# Patient Record
Sex: FEMALE | Race: WHITE | NOT HISPANIC OR LATINO
[De-identification: names, ages, dates, MRNs, and addresses within clinical notes are randomized per-mention and may not be internally consistent; named-entity substitution may affect disease eponyms.]

---

## 2019-09-25 PROBLEM — Z00.00 ENCOUNTER FOR PREVENTIVE HEALTH EXAMINATION: Status: ACTIVE | Noted: 2019-09-25

## 2019-09-26 ENCOUNTER — APPOINTMENT (OUTPATIENT)
Dept: VASCULAR SURGERY | Facility: CLINIC | Age: 64
End: 2019-09-26
Payer: COMMERCIAL

## 2019-09-26 DIAGNOSIS — Z86.73 PERSONAL HISTORY OF TRANSIENT ISCHEMIC ATTACK (TIA), AND CEREBRAL INFARCTION W/OUT RESIDUAL DEFICITS: ICD-10-CM

## 2019-09-26 DIAGNOSIS — Z86.79 PERSONAL HISTORY OF OTHER DISEASES OF THE CIRCULATORY SYSTEM: ICD-10-CM

## 2019-09-26 DIAGNOSIS — M62.9 DISORDER OF MUSCLE, UNSPECIFIED: ICD-10-CM

## 2019-09-26 DIAGNOSIS — Z78.9 OTHER SPECIFIED HEALTH STATUS: ICD-10-CM

## 2019-09-26 PROCEDURE — 99203 OFFICE O/P NEW LOW 30 MIN: CPT

## 2019-09-26 PROCEDURE — 93923 UPR/LXTR ART STDY 3+ LVLS: CPT

## 2019-09-26 NOTE — ASSESSMENT
[FreeTextEntry1] : 63 y/o F w/ intermittent calf claudication.\par Discussed with her the natural hx of PAD, as well as importance of risk factor control and exercise.\par She will perform a daily structured walking program, with the goal of increasing her walking distance.\par She will f/u with me in 3 months. [Arterial/Venous Disease] : arterial/venous disease

## 2019-09-26 NOTE — PHYSICAL EXAM
[Normal Breath Sounds] : Normal breath sounds [2+] : left 2+ [1+] : right 1+ [0] : left 0 [Alert] : alert [Oriented to Place] : oriented to place [Oriented to Person] : oriented to person [Calm] : calm [Ankle Swelling (On Exam)] : not present [Varicose Veins Of Lower Extremities] : not present [] : not present [de-identified] : NAD [de-identified] : NCAT [de-identified] : supple [de-identified] : soft, NT, ND; no palpable masses

## 2019-09-26 NOTE — HISTORY OF PRESENT ILLNESS
[FreeTextEntry1] : 65 y/o F here for evaluation of pain in legs with ambulation.\par She reports that her right calf becomes painful, tight and crampy when walking. Symptoms generally come on after walking for 5-10 minutes. Symptoms melisa on stopping for a couple minutes.\par These constellation of symptoms have been present for about 1.5 yrs.\par Long hx of smoking, quit 2 yrs ago.\par No hx of CAD, MI.

## 2019-09-26 NOTE — PROCEDURE
[FreeTextEntry1] : LIDIA / PVR performed - R LIDIA .68, L .63, some blunting of waveforms down calf and ankle

## 2019-09-26 NOTE — REVIEW OF SYSTEMS
[Leg Claudication] : intermittent leg claudication [Limb Pain] : limb pain [Negative] : Neurological

## 2019-12-19 ENCOUNTER — APPOINTMENT (OUTPATIENT)
Dept: VASCULAR SURGERY | Facility: CLINIC | Age: 64
End: 2019-12-19

## 2020-01-30 ENCOUNTER — APPOINTMENT (OUTPATIENT)
Dept: VASCULAR SURGERY | Facility: CLINIC | Age: 65
End: 2020-01-30
Payer: COMMERCIAL

## 2020-01-30 PROCEDURE — 99214 OFFICE O/P EST MOD 30 MIN: CPT

## 2020-02-21 NOTE — PHYSICAL EXAM
[Normal Breath Sounds] : Normal breath sounds [2+] : left 2+ [1+] : right 1+ [0] : left 0 [Varicose Veins Of Lower Extremities] : not present [] : not present [Ankle Swelling (On Exam)] : not present [Alert] : alert [Oriented to Person] : oriented to person [Calm] : calm [Oriented to Place] : oriented to place [de-identified] : NCAT [de-identified] : NAD [de-identified] : soft, NT, ND; no palpable masses [de-identified] : supple

## 2020-02-21 NOTE — HISTORY OF PRESENT ILLNESS
[FreeTextEntry1] : 63 y/o F here for evaluation of pain in legs with ambulation.\par She reports that her right calf becomes painful, tight and crampy when walking. Symptoms generally come on after walking for 5-10 minutes. Symptoms melisa on stopping for a couple minutes.\par These constellation of symptoms have been present for about 1.5 yrs.\par Long hx of smoking, quit 2 yrs ago.\par No hx of CAD, MI. [de-identified] : 1/30/20 - Doing well. Reports that she was mostly been walking daily. Walking distance improved.

## 2020-02-21 NOTE — ASSESSMENT
[FreeTextEntry1] : 65 y/o F w/ intermittent calf claudication.\par Discussed with her the natural hx of PAD, as well as importance of risk factor control and exercise.\par Her walking distance has improved with the adherence to daily walking.\par She reports that she can do better, and will continue to walk daily.\par Again discussed the importance of excellent foot care.\par She will perform a daily structured walking program, with the goal of increasing her walking distance.\par She will f/u with me in 3 months. [Arterial/Venous Disease] : arterial/venous disease

## 2020-04-30 ENCOUNTER — APPOINTMENT (OUTPATIENT)
Dept: VASCULAR SURGERY | Facility: CLINIC | Age: 65
End: 2020-04-30

## 2020-07-23 ENCOUNTER — APPOINTMENT (OUTPATIENT)
Dept: VASCULAR SURGERY | Facility: CLINIC | Age: 65
End: 2020-07-23
Payer: COMMERCIAL

## 2020-07-23 PROCEDURE — 93923 UPR/LXTR ART STDY 3+ LVLS: CPT

## 2020-07-23 PROCEDURE — 99214 OFFICE O/P EST MOD 30 MIN: CPT

## 2020-07-23 NOTE — HISTORY OF PRESENT ILLNESS
[FreeTextEntry1] : 63 y/o F here for evaluation of pain in legs with ambulation.\par She reports that her right calf becomes painful, tight and crampy when walking. Symptoms generally come on after walking for 5-10 minutes. Symptoms melisa on stopping for a couple minutes.\par These constellation of symptoms have been present for about 1.5 yrs.\par Long hx of smoking, quit 2 yrs ago.\par No hx of CAD, MI. [de-identified] : 1/30/20 - Doing well. Reports that she was mostly been walking daily. Walking distance improved.\par \par 7/23/20 - Reports that her walking has not been consistent due to pandemic. In addition, she has been under evaluation by podiatrist Dr. Landaverde for bunion and claw to deformity of right foot, considering intervention. Denies calf or foot pain.

## 2020-07-23 NOTE — PROCEDURE
[FreeTextEntry1] : LIDIA / PVR - R and L LIDIA 0.69, dampening of waveforms at metatarsal, maintains some pulsatility at digit

## 2020-07-23 NOTE — ASSESSMENT
[FreeTextEntry1] : 63 y/o F w/ intermittent calf claudication.\par Discussed with her the natural hx of PAD, as well as importance of risk factor control and exercise.\par Her walking distance has improved with the adherence to daily walking.\par She reports that she can do better, and will continue to walk daily.\par Again discussed the importance of excellent foot care.\par I will discuss her arterial disease with her podiatrist and we will decide if he will proceed with podiatric procedure\par She will f/u with me in 2 months [Arterial/Venous Disease] : arterial/venous disease

## 2020-07-23 NOTE — PHYSICAL EXAM
[Normal Breath Sounds] : Normal breath sounds [2+] : left 2+ [1+] : right 1+ [Ankle Swelling (On Exam)] : not present [0] : left 0 [] : not present [Alert] : alert [Varicose Veins Of Lower Extremities] : not present [Oriented to Place] : oriented to place [Oriented to Person] : oriented to person [Calm] : calm [de-identified] : NAD [de-identified] : NCAT [de-identified] : supple [de-identified] : soft, NT, ND; no palpable masses

## 2020-09-17 ENCOUNTER — APPOINTMENT (OUTPATIENT)
Dept: VASCULAR SURGERY | Facility: CLINIC | Age: 65
End: 2020-09-17

## 2020-12-14 ENCOUNTER — APPOINTMENT (OUTPATIENT)
Dept: HEMATOLOGY ONCOLOGY | Facility: CLINIC | Age: 65
End: 2020-12-14
Payer: COMMERCIAL

## 2020-12-14 VITALS
WEIGHT: 155 LBS | TEMPERATURE: 98.2 F | HEIGHT: 64 IN | DIASTOLIC BLOOD PRESSURE: 92 MMHG | BODY MASS INDEX: 26.46 KG/M2 | SYSTOLIC BLOOD PRESSURE: 158 MMHG | HEART RATE: 100 BPM | RESPIRATION RATE: 18 BRPM | OXYGEN SATURATION: 96 %

## 2020-12-14 DIAGNOSIS — Z80.0 FAMILY HISTORY OF MALIGNANT NEOPLASM OF DIGESTIVE ORGANS: ICD-10-CM

## 2020-12-14 DIAGNOSIS — Z87.891 PERSONAL HISTORY OF NICOTINE DEPENDENCE: ICD-10-CM

## 2020-12-14 PROCEDURE — 99072 ADDL SUPL MATRL&STAF TM PHE: CPT

## 2020-12-14 PROCEDURE — 99205 OFFICE O/P NEW HI 60 MIN: CPT

## 2020-12-15 NOTE — ASSESSMENT
[FreeTextEntry1] : This appears to be a relatively acute process as she reports hemoglobin levels of more than 13 g/dL in July from routine blood work performed at office visits.   Given how quickly and significantly her hemoglobin has dropped this past summer, to me this suggests more of an RBC loss/destructive process rather than an RBC production issue.  She has already been initiated on intravenous iron by her gastroenterologist and I will evaluate her hematopoietic potential once iron stores have been replete adequately.  She reports an essentially unremarkable bidirectional endoscopies and a recent capsule endoscopy.  I will request the records from the above.\par I will obtain a CBC with differential and a peripheral blood smear review.\par I will obtain a complete hematological workup to include a CMP, LDH, haptoglobin, reticulocyte count, B12/MMA/folic acid levels, TSH, SPEP, SIEP, IgQ,serum free light chains, NATO,  RF, panel, ferritin level, and a Jose' test.\par The patient will return in 2 weeks for followup, review of the above workup, and further recommendations.\par \par Thank you very much for allowing me to participate in the care of this patient. Should you have any questions or concerns please do not hesitate to call me directly.

## 2020-12-15 NOTE — HISTORY OF PRESENT ILLNESS
[de-identified] : Mrs. Manley is a 65 year old female who is referred by Dr.Michael Truong for second opinion for anemia. \par She reports having normal blood counts as of July but was found in Sept to have symptomatic anemia. She has received several transfusions most recently this weekend.  She is also receiving IV iron infusions from her gastroenterologist.  Endoscopy, colonoscopy and capsule endoscopy were all negative and her GI believes that her NEGRA is unrelated to GI issues.  [0 - No Distress] : Distress Level: 0

## 2020-12-18 LAB
25(OH)D3 SERPL-MCNC: 46.5 NG/ML
ALBUMIN MFR SERPL ELPH: 53.7 %
ALBUMIN SERPL-MCNC: 3.9 G/DL
ALBUMIN/GLOB SERPL: 1.2 RATIO
ALPHA1 GLOB MFR SERPL ELPH: 5.4 %
ALPHA1 GLOB SERPL ELPH-MCNC: 0.4 G/DL
ALPHA2 GLOB MFR SERPL ELPH: 14 %
ALPHA2 GLOB SERPL ELPH-MCNC: 1 G/DL
ANA SER IF-ACNC: NEGATIVE
B-GLOBULIN MFR SERPL ELPH: 14.5 %
B-GLOBULIN SERPL ELPH-MCNC: 1 G/DL
DEPRECATED KAPPA LC FREE/LAMBDA SER: 1.94 RATIO
DEPRECATED KAPPA LC FREE/LAMBDA SER: 1.94 RATIO
DIRECT COOMBS: NORMAL
EPO SERPL-MCNC: 19 MIU/ML
FERRITIN SERPL-MCNC: 608 NG/ML
FOLATE SERPL-MCNC: >20 NG/ML
GAMMA GLOB FLD ELPH-MCNC: 0.9 G/DL
GAMMA GLOB MFR SERPL ELPH: 12.4 %
HAPTOGLOB SERPL-MCNC: 228 MG/DL
IGA SER QL IEP: 327 MG/DL
IGG SER QL IEP: 872 MG/DL
IGM SER QL IEP: 58 MG/DL
INTERPRETATION SERPL IEP-IMP: NORMAL
IRON SATN MFR SERPL: 18 %
IRON SERPL-MCNC: 70 UG/DL
KAPPA LC CSF-MCNC: 4.25 MG/DL
KAPPA LC CSF-MCNC: 4.25 MG/DL
KAPPA LC SERPL-MCNC: 8.23 MG/DL
KAPPA LC SERPL-MCNC: 8.23 MG/DL
LDH SERPL-CCNC: 233 U/L
M PROTEIN SPEC IFE-MCNC: NORMAL
PROT SERPL-MCNC: 7.2 G/DL
PROT SERPL-MCNC: 7.2 G/DL
RHEUMATOID FACT SER QL: <10 IU/ML
TIBC SERPL-MCNC: 390 UG/DL
UIBC SERPL-MCNC: 320 UG/DL
VIT B12 SERPL-MCNC: 1041 PG/ML

## 2020-12-27 LAB — METHYLMALONATE SERPL-SCNC: 404 NMOL/L

## 2020-12-28 ENCOUNTER — APPOINTMENT (OUTPATIENT)
Dept: HEMATOLOGY ONCOLOGY | Facility: CLINIC | Age: 65
End: 2020-12-28
Payer: COMMERCIAL

## 2020-12-28 VITALS
HEIGHT: 64 IN | BODY MASS INDEX: 26.46 KG/M2 | TEMPERATURE: 97.9 F | HEART RATE: 100 BPM | DIASTOLIC BLOOD PRESSURE: 91 MMHG | WEIGHT: 155 LBS | RESPIRATION RATE: 18 BRPM | SYSTOLIC BLOOD PRESSURE: 175 MMHG | OXYGEN SATURATION: 96 %

## 2020-12-28 DIAGNOSIS — R79.89 OTHER SPECIFIED ABNORMAL FINDINGS OF BLOOD CHEMISTRY: ICD-10-CM

## 2020-12-28 PROCEDURE — 99214 OFFICE O/P EST MOD 30 MIN: CPT

## 2020-12-28 PROCEDURE — 99072 ADDL SUPL MATRL&STAF TM PHE: CPT

## 2020-12-28 NOTE — ASSESSMENT
[FreeTextEntry1] : This appears to be a relatively acute process as she reports hemoglobin levels of more than 13 g/dL in July from routine blood work performed at office visits.   Given how quickly and significantly her hemoglobin has dropped this past summer, to me this suggests more of an RBC loss/destructive process rather than an RBC production issue.  She has already been initiated on intravenous iron by her gastroenterologist and I will evaluate her hematopoietic potential once iron stores have been replete adequately.  She reports an essentially unremarkable bidirectional endoscopies and a recent capsule endoscopy.  I have requested the records from the above.\par I obtained a CBC with differential and a peripheral blood smear review.\par I obtained a complete hematological workup that included a CMP, LDH, haptoglobin, reticulocyte count, B12/MMA/folic acid levels, TSH, SPEP, SIEP, IgQ,serum free light chains, NATO,  RF, panel, ferritin level, and a Jose' test.  Her hemoglobin has now improved to 11.3 g/dL, iron indices have mostly normalized, and her serum methylmalonic acid level was elevated.  Given this I will initiate her on parenteral B12 and will monitor her iron indices.   Another contributing factor may be her chronic renal insufficiency.    At this time she does not warrant or qualify for LEVI therapy.  She will return in 1 month for follow-up with repeat laboratories.

## 2020-12-28 NOTE — HISTORY OF PRESENT ILLNESS
[0 - No Distress] : Distress Level: 0 [de-identified] : Mrs. Manley is a 65 year old female who is referred by Dr.Michael Truong for second opinion for anemia. \par She reports having normal blood counts as of July but was found in Sept to have symptomatic anemia. She has received several transfusions most recently this weekend.  She is also receiving IV iron infusions from her gastroenterologist.  Endoscopy, colonoscopy and capsule endoscopy were all negative and her GI believes that her NEGRA is unrelated to GI issues.  [de-identified] : Reports that she is generally feeling better at this time.  She has now completed her IV iron therapy with her gastroenterologist.

## 2020-12-29 ENCOUNTER — TRANSCRIPTION ENCOUNTER (OUTPATIENT)
Age: 65
End: 2020-12-29

## 2021-01-05 ENCOUNTER — NON-APPOINTMENT (OUTPATIENT)
Age: 66
End: 2021-01-05

## 2021-01-25 ENCOUNTER — APPOINTMENT (OUTPATIENT)
Dept: HEMATOLOGY ONCOLOGY | Facility: CLINIC | Age: 66
End: 2021-01-25
Payer: COMMERCIAL

## 2021-01-25 VITALS
SYSTOLIC BLOOD PRESSURE: 180 MMHG | RESPIRATION RATE: 18 BRPM | DIASTOLIC BLOOD PRESSURE: 84 MMHG | BODY MASS INDEX: 26.29 KG/M2 | TEMPERATURE: 98 F | HEART RATE: 90 BPM | HEIGHT: 64 IN | OXYGEN SATURATION: 96 % | WEIGHT: 154 LBS

## 2021-01-25 PROCEDURE — 99214 OFFICE O/P EST MOD 30 MIN: CPT

## 2021-01-25 PROCEDURE — 99072 ADDL SUPL MATRL&STAF TM PHE: CPT

## 2021-01-25 NOTE — ASSESSMENT
[FreeTextEntry1] : This appears to be a relatively acute process as she reports hemoglobin levels of more than 13 g/dL in July from routine blood work performed at office visits.   Given how quickly and significantly her hemoglobin has dropped this past summer, to me this suggests more of an RBC loss/destructive process rather than an RBC production issue.  She has already been initiated on intravenous iron by her gastroenterologist and I will evaluate her hematopoietic potential once iron stores have been replete adequately.  She reports an essentially unremarkable bidirectional endoscopies and a recent capsule endoscopy.  I have requested the records from the above.\par I obtained a CBC with differential and a peripheral blood smear review.\par I obtained a complete hematological workup that included a CMP, LDH, haptoglobin, reticulocyte count, B12/MMA/folic acid levels, TSH, SPEP, SIEP, IgQ,serum free light chains, NATO,  RF, panel, ferritin level, and a Jose' test.  Her hemoglobin has now improved to 11.3 g/dL, iron indices have mostly normalized, and her serum methylmalonic acid level was elevated.  Given this I initiated her on parenteral B12 and will monitor her iron indices.  Another contributing factor may be her chronic renal insufficiency.    At this time she does not warrant or qualify for LEVI therapy.   Her hemoglobin has decreased somewhat to 10.3 g/dL today.  She will continue parenteral B12 and return in 2 weeks with repeat counts to assure that they remain stable or improved.  Her iron indices will be rechecked.

## 2021-01-25 NOTE — HISTORY OF PRESENT ILLNESS
[0 - No Distress] : Distress Level: 0 [de-identified] : Mrs. Manley is a 65 year old female who is referred by Dr.Michael Truong for second opinion for anemia. \par She reports having normal blood counts as of July but was found in Sept to have symptomatic anemia. She has received several transfusions most recently this weekend.  She is also receiving IV iron infusions from her gastroenterologist.  Endoscopy, colonoscopy and capsule endoscopy were all negative and her GI believes that her NEGRA is unrelated to GI issues.  [de-identified] : Reports that she is generally feeling better at this time.  She has now completed her IV iron therapy with her gastroenterologist.

## 2021-02-08 ENCOUNTER — APPOINTMENT (OUTPATIENT)
Dept: HEMATOLOGY ONCOLOGY | Facility: CLINIC | Age: 66
End: 2021-02-08
Payer: COMMERCIAL

## 2021-02-08 VITALS
DIASTOLIC BLOOD PRESSURE: 84 MMHG | WEIGHT: 153 LBS | SYSTOLIC BLOOD PRESSURE: 165 MMHG | RESPIRATION RATE: 18 BRPM | OXYGEN SATURATION: 97 % | TEMPERATURE: 98.5 F | BODY MASS INDEX: 26.12 KG/M2 | HEART RATE: 92 BPM | HEIGHT: 64 IN

## 2021-02-08 PROCEDURE — 99072 ADDL SUPL MATRL&STAF TM PHE: CPT

## 2021-02-08 PROCEDURE — 99214 OFFICE O/P EST MOD 30 MIN: CPT

## 2021-02-08 NOTE — HISTORY OF PRESENT ILLNESS
[0 - No Distress] : Distress Level: 0 [de-identified] : Mrs. Manley is a 65 year old female who is referred by Dr.Michael Truong for second opinion for anemia. \par She reports having normal blood counts as of July but was found in Sept to have symptomatic anemia. She has received several transfusions most recently this weekend.  She is also receiving IV iron infusions from her gastroenterologist.  Endoscopy, colonoscopy and capsule endoscopy were all negative and her GI believes that her NEGRA is unrelated to GI issues.  [de-identified] : Reports that she is generally feeling better at this time.  She has now completed her IV iron therapy with her gastroenterologist.

## 2021-02-08 NOTE — ASSESSMENT
[FreeTextEntry1] : This appears to be a relatively acute process as she reports hemoglobin levels of more than 13 g/dL in July from routine blood work performed at office visits.   Given how quickly and significantly her hemoglobin has dropped this past summer, to me this suggests more of an RBC loss/destructive process rather than an RBC production issue.  She has already been initiated on intravenous iron by her gastroenterologist and I will evaluate her hematopoietic potential once iron stores have been replete adequately.  She reports an essentially unremarkable bidirectional endoscopies and a recent capsule endoscopy.  I have requested the records from the above.\par I obtained a CBC with differential and a peripheral blood smear review.\par I obtained a complete hematological workup that included a CMP, LDH, haptoglobin, reticulocyte count, B12/MMA/folic acid levels, TSH, SPEP, SIEP, IgQ,serum free light chains, NATO,  RF, panel, ferritin level, and a Jose' test.  Her hemoglobin had improved to 11.3 g/dL, iron indices have mostly normalized, and her serum methylmalonic acid level was elevated.  Given this I initiated her on parenteral B12 and will monitor her iron indices.  Another contributing factor may be her chronic renal insufficiency.    At this time she does not warrant or qualify for LEVI therapy.   Her hemoglobin has decreased somewhat to 10.3 g/dL at the last visit.  She will continue parenteral B12 and return in 2 weeks with repeat counts to assure that they remain stable or improved and B12.  Her iron indices will be rechecked.

## 2021-02-22 LAB
FERRITIN SERPL-MCNC: 395 NG/ML
FERRITIN SERPL-MCNC: 62 NG/ML
FERRITIN SERPL-MCNC: 90 NG/ML
IRON SATN MFR SERPL: 43 %
IRON SATN MFR SERPL: NORMAL %
IRON SERPL-MCNC: 146 UG/DL
IRON SERPL-MCNC: 355 UG/DL
TIBC SERPL-MCNC: 340 UG/DL
TIBC SERPL-MCNC: NORMAL UG/DL
UIBC SERPL-MCNC: 193 UG/DL
UIBC SERPL-MCNC: <20 UG/DL

## 2021-02-24 ENCOUNTER — APPOINTMENT (OUTPATIENT)
Dept: HEMATOLOGY ONCOLOGY | Facility: CLINIC | Age: 66
End: 2021-02-24
Payer: COMMERCIAL

## 2021-02-24 VITALS
BODY MASS INDEX: 25.92 KG/M2 | HEART RATE: 90 BPM | RESPIRATION RATE: 18 BRPM | SYSTOLIC BLOOD PRESSURE: 173 MMHG | OXYGEN SATURATION: 96 % | DIASTOLIC BLOOD PRESSURE: 81 MMHG | TEMPERATURE: 98 F | WEIGHT: 151 LBS

## 2021-02-24 PROCEDURE — 99072 ADDL SUPL MATRL&STAF TM PHE: CPT

## 2021-02-24 PROCEDURE — 99214 OFFICE O/P EST MOD 30 MIN: CPT

## 2021-02-24 RX ORDER — SIMVASTATIN 5 MG/1
5 TABLET, FILM COATED ORAL
Refills: 0 | Status: ACTIVE | COMMUNITY

## 2021-02-24 RX ORDER — POLYSACCHARIDE-IRON COMPLEX 150 MG/1
CAPSULE ORAL
Refills: 0 | Status: ACTIVE | COMMUNITY

## 2021-02-24 RX ORDER — UBIDECARENONE/VIT E ACET 100MG-5
1000 CAPSULE ORAL
Refills: 0 | Status: ACTIVE | COMMUNITY

## 2021-02-24 RX ORDER — CLOPIDOGREL BISULFATE 75 MG/1
75 TABLET, FILM COATED ORAL
Refills: 0 | Status: ACTIVE | COMMUNITY

## 2021-02-24 RX ORDER — PANTOPRAZOLE SODIUM 40 MG/1
40 TABLET, DELAYED RELEASE ORAL
Refills: 0 | Status: ACTIVE | COMMUNITY

## 2021-02-24 NOTE — ASSESSMENT
[FreeTextEntry1] : This appears to be a relatively acute process as she reports hemoglobin levels of more than 13 g/dL in July from routine blood work performed at office visits.   Given how quickly and significantly her hemoglobin has dropped this past summer, to me this suggests more of an RBC loss/destructive process rather than an RBC production issue.  She has already been initiated on intravenous iron by her gastroenterologist and I will evaluate her hematopoietic potential once iron stores have been replete adequately.  She reports an essentially unremarkable bidirectional endoscopies and a recent capsule endoscopy.  I have requested the records from the above.\par I obtained a CBC with differential and a peripheral blood smear review.\par I obtained a complete hematological workup that included a CMP, LDH, haptoglobin, reticulocyte count, B12/MMA/folic acid levels, TSH, SPEP, SIEP, IgQ,serum free light chains, NATO,  RF, panel, ferritin level, and a Jose' test.  Her hemoglobin had improved to 11.3 g/dL, iron indices have mostly normalized, and her serum methylmalonic acid level was elevated.  Given this I initiated her on parenteral B12 and will monitor her iron indices.  Another contributing factor may be her chronic renal insufficiency.    At this time she does not warrant or qualify for LEVI therapy.   Her hemoglobin has improved to 12.3.  Continue monthly B12.  We will forward her labs to her nephrologist for renal insufficiency.  She is not a candidate for LEVI's this time\par Continue routine, age-appropriate, healthcare maintenance \par History of present illness, review of systems, physical exam and treatment plan reviewed with .\par Office visit in 4 weeks or prn for new or worsening symptoms.

## 2021-02-24 NOTE — HISTORY OF PRESENT ILLNESS
[0 - No Distress] : Distress Level: 0 [de-identified] : Mrs. Manley is a 65 year old female who is referred by Dr.Michael Truong for second opinion for anemia. \par She reports having normal blood counts as of July but was found in Sept to have symptomatic anemia. She has received several transfusions most recently this weekend.  She is also receiving IV iron infusions from her gastroenterologist.  Endoscopy, colonoscopy and capsule endoscopy were all negative and her GI believes that her NEGRA is unrelated to GI issues.  [FreeTextEntry1] : Parenteral B12 monthly [de-identified] : She presents for follow-up of B12 and iron deficiency anemias, sending for monthly dose of B12 parenterally.  She reports feeling well.  Her hemoglobin has increased from 10.2-12.3 with B12 therapy.  She begins monthly dosing today as she has finished her loading dose .  Completed IV iron therapy with her gastroenterologist several months ago and continues on oral iron therapy with some constipation.

## 2021-02-24 NOTE — RESULTS/DATA
[FreeTextEntry1] : WBC 6.95\par Hemoglobin 12.3\par Hematocrit 40.3\par Platelet count 313,000\par Creatinine 1.52\par EGFR 36

## 2021-03-24 ENCOUNTER — APPOINTMENT (OUTPATIENT)
Dept: HEMATOLOGY ONCOLOGY | Facility: CLINIC | Age: 66
End: 2021-03-24
Payer: COMMERCIAL

## 2021-03-24 VITALS
TEMPERATURE: 98.1 F | SYSTOLIC BLOOD PRESSURE: 158 MMHG | RESPIRATION RATE: 18 BRPM | DIASTOLIC BLOOD PRESSURE: 90 MMHG | OXYGEN SATURATION: 96 % | HEIGHT: 64 IN | HEART RATE: 90 BPM | WEIGHT: 153 LBS | BODY MASS INDEX: 26.12 KG/M2

## 2021-03-24 PROCEDURE — 99072 ADDL SUPL MATRL&STAF TM PHE: CPT

## 2021-03-24 PROCEDURE — 99214 OFFICE O/P EST MOD 30 MIN: CPT

## 2021-03-24 NOTE — HISTORY OF PRESENT ILLNESS
[de-identified] : Mrs. Manley is a 65 year old female who is referred by Dr.Michael Truong for second opinion for anemia. \par She reports having normal blood counts as of July but was found in Sept to have symptomatic anemia. She has received several transfusions most recently this weekend.  She is also receiving IV iron infusions from her gastroenterologist.  Endoscopy, colonoscopy and capsule endoscopy were all negative and her GI believes that her NEGRA is unrelated to GI issues.  [FreeTextEntry1] : Parenteral B12 monthly [de-identified] : She presents for follow-up of B12 and iron deficiency anemias, sending for monthly dose of B12 parenterally.  She reports feeling well.  Her hemoglobin has increased with B12 therapy.  Completed IV iron therapy with her gastroenterologist several months ago and continues on oral iron therapy with some constipation.

## 2021-04-12 ENCOUNTER — NON-APPOINTMENT (OUTPATIENT)
Age: 66
End: 2021-04-12

## 2021-04-21 ENCOUNTER — APPOINTMENT (OUTPATIENT)
Dept: HEMATOLOGY ONCOLOGY | Facility: CLINIC | Age: 66
End: 2021-04-21
Payer: COMMERCIAL

## 2021-04-21 VITALS
HEART RATE: 91 BPM | SYSTOLIC BLOOD PRESSURE: 154 MMHG | RESPIRATION RATE: 18 BRPM | WEIGHT: 152 LBS | BODY MASS INDEX: 25.95 KG/M2 | OXYGEN SATURATION: 96 % | TEMPERATURE: 98 F | HEIGHT: 64 IN | DIASTOLIC BLOOD PRESSURE: 85 MMHG

## 2021-04-21 LAB
FERRITIN SERPL-MCNC: 57 NG/ML
IRON SATN MFR SERPL: 15 %
IRON SERPL-MCNC: 53 UG/DL
TIBC SERPL-MCNC: 350 UG/DL
UIBC SERPL-MCNC: 297 UG/DL

## 2021-04-21 PROCEDURE — 99214 OFFICE O/P EST MOD 30 MIN: CPT

## 2021-04-21 PROCEDURE — 99072 ADDL SUPL MATRL&STAF TM PHE: CPT

## 2021-04-22 LAB
FERRITIN SERPL-MCNC: 44 NG/ML
IRON SATN MFR SERPL: 13 %
IRON SERPL-MCNC: 48 UG/DL
TIBC SERPL-MCNC: 364 UG/DL
UIBC SERPL-MCNC: 316 UG/DL

## 2021-04-22 NOTE — ASSESSMENT
[FreeTextEntry1] : This appears to be a relatively acute process as she reports hemoglobin levels of more than 13 g/dL in July from routine blood work performed at office visits.   Given how quickly and significantly her hemoglobin has dropped this past summer, to me this suggests more of an RBC loss/destructive process rather than an RBC production issue.  She has already been initiated on intravenous iron by her gastroenterologist and I will evaluate her hematopoietic potential once iron stores have been replete adequately.  She reports an essentially unremarkable bidirectional endoscopies and a recent capsule endoscopy.  I have requested the records from the above.\par I obtained a CBC with differential and a peripheral blood smear review.\par I obtained a complete hematological workup that included a CMP, LDH, haptoglobin, reticulocyte count, B12/MMA/folic acid levels, TSH, SPEP, SIEP, IgQ,serum free light chains, NATO,  RF, panel, ferritin level, and a Jose' test.  Her hemoglobin had improved to 11.3 g/dL, iron indices have mostly normalized, and her serum methylmalonic acid level was elevated.  Given this I initiated her on parenteral B12 and will monitor her iron indices.  Another contributing factor may be her chronic renal insufficiency.    At this time she does not warrant or qualify for LEVI therapy.   Her hemoglobin has improved to 13.8.  Continue monthly B12.  We will forward her labs to her nephrologist for renal insufficiency.  She is not a candidate for LEVI's this time\par Continue routine, age-appropriate, healthcare maintenance \par Office visit in 4 weeks or prn for new or worsening symptoms.

## 2021-04-22 NOTE — HISTORY OF PRESENT ILLNESS
[de-identified] : Mrs. Manley is a 66 year old female who is referred by Dr.Michael Truong for second opinion for anemia. \par She reports having normal blood counts as of July but was found in Sept to have symptomatic anemia. She has received several transfusions.  She had also received IV iron infusions from her gastroenterologist.  Endoscopy, colonoscopy and capsule endoscopy were all negative and her GI believes that her NEGRA is unrelated to GI issues.  [FreeTextEntry1] : Parenteral B12 monthly [de-identified] : She presents for follow-up of B12 and iron deficiency anemias, sending for monthly dose of B12 parenterally.  She reports feeling well.  Her hemoglobin has increased with B12 therapy.  Completed IV iron therapy with her gastroenterologist several months ago and continues on oral iron therapy with some constipation.

## 2021-04-26 ENCOUNTER — NON-APPOINTMENT (OUTPATIENT)
Age: 66
End: 2021-04-26

## 2021-05-20 ENCOUNTER — APPOINTMENT (OUTPATIENT)
Dept: HEMATOLOGY ONCOLOGY | Facility: CLINIC | Age: 66
End: 2021-05-20
Payer: COMMERCIAL

## 2021-05-20 VITALS
RESPIRATION RATE: 18 BRPM | DIASTOLIC BLOOD PRESSURE: 78 MMHG | BODY MASS INDEX: 24.92 KG/M2 | OXYGEN SATURATION: 96 % | SYSTOLIC BLOOD PRESSURE: 160 MMHG | HEART RATE: 89 BPM | HEIGHT: 64 IN | WEIGHT: 146 LBS | TEMPERATURE: 97.4 F

## 2021-05-20 PROCEDURE — 99072 ADDL SUPL MATRL&STAF TM PHE: CPT

## 2021-05-20 PROCEDURE — 99214 OFFICE O/P EST MOD 30 MIN: CPT

## 2021-05-28 LAB
FERRITIN SERPL-MCNC: 567 NG/ML
IRON SATN MFR SERPL: 24 %
IRON SERPL-MCNC: 69 UG/DL
TIBC SERPL-MCNC: 287 UG/DL
UIBC SERPL-MCNC: 218 UG/DL

## 2021-05-28 NOTE — HISTORY OF PRESENT ILLNESS
[0 - No Distress] : Distress Level: 0 [de-identified] : Mrs. Manley is a 66 year old female who is referred by Dr.Michael Truong for second opinion for anemia. \par She reports having normal blood counts as of July but was found in Sept to have symptomatic anemia. She has received several transfusions.  She had also received IV iron infusions from her gastroenterologist.  Endoscopy, colonoscopy and capsule endoscopy were all negative and her GI believes that her NEGRA is unrelated to GI issues.  [FreeTextEntry1] : Parenteral B12 monthly [de-identified] : She presents for follow-up of B12 and iron deficiency anemias, receiving monthly dose of B12 parenterally.  She completed Venofer recently.  Feels well.  She denies rash, fever, infections, bleeding, bruising, nausea,vomiting,cough, SOB, chest pain, change in BM, headache or dizziness.

## 2021-05-28 NOTE — ASSESSMENT
[FreeTextEntry1] : This appears to be a relatively acute process as she reports hemoglobin levels of more than 13 g/dL in July from routine blood work performed at office visits.   Given how quickly and significantly her hemoglobin has dropped this past summer, to me this suggests more of an RBC loss/destructive process rather than an RBC production issue.  She has already been initiated on intravenous iron by her gastroenterologist and I will evaluate her hematopoietic potential once iron stores have been replete adequately.  She reports an essentially unremarkable bidirectional endoscopies and a recent capsule endoscopy.  I have requested the records from the above.\par I obtained a CBC with differential and a peripheral blood smear review.\par I obtained a complete hematological workup that included a CMP, LDH, haptoglobin, reticulocyte count, B12/MMA/folic acid levels, TSH, SPEP, SIEP, IgQ,serum free light chains, NATO,  RF, panel, ferritin level, and a Jose' test.  Her hemoglobin had improved to 11.3 g/dL, iron indices have mostly normalized, and her serum methylmalonic acid level was elevated.  Given this I initiated her on parenteral B12 and will monitor her iron indices.  Another contributing factor may be her chronic renal insufficiency.    At this time she does not warrant or qualify for LEVI therapy.   Her hemoglobin has improved to 15.2.  Continue monthly B12.  We will forward her labs to her nephrologist for renal insufficiency.  She is not a candidate for LEVI's this time\par She completed Venofer recently\par Continue routine, age-appropriate, healthcare maintenance \par History of present illness, review of systems, physical exam and treatment plan reviewed with .\par Office visit in 4 weeks or prn for new or worsening symptoms.

## 2021-07-06 ENCOUNTER — APPOINTMENT (OUTPATIENT)
Dept: HEMATOLOGY ONCOLOGY | Facility: CLINIC | Age: 66
End: 2021-07-06
Payer: COMMERCIAL

## 2021-07-06 VITALS
OXYGEN SATURATION: 97 % | HEART RATE: 82 BPM | SYSTOLIC BLOOD PRESSURE: 165 MMHG | DIASTOLIC BLOOD PRESSURE: 77 MMHG | WEIGHT: 145 LBS | BODY MASS INDEX: 28.47 KG/M2 | TEMPERATURE: 98.1 F | RESPIRATION RATE: 18 BRPM | HEIGHT: 60 IN

## 2021-07-06 PROCEDURE — 99213 OFFICE O/P EST LOW 20 MIN: CPT

## 2021-07-06 PROCEDURE — 99072 ADDL SUPL MATRL&STAF TM PHE: CPT

## 2021-07-06 RX ORDER — VERAPAMIL HYDROCHLORIDE 360 MG/1
360 CAPSULE, DELAYED RELEASE PELLETS ORAL
Qty: 30 | Refills: 0 | Status: ACTIVE | COMMUNITY
Start: 2021-06-21

## 2021-07-11 LAB
FERRITIN SERPL-MCNC: 321 NG/ML
IRON SATN MFR SERPL: 28 %
IRON SERPL-MCNC: 96 UG/DL
TIBC SERPL-MCNC: 348 UG/DL
UIBC SERPL-MCNC: 252 UG/DL

## 2021-07-11 NOTE — HISTORY OF PRESENT ILLNESS
[0 - No Distress] : Distress Level: 0 [de-identified] : Mrs. Manley is a 66 year old female who is referred by Dr.Michael Truong for second opinion for anemia. \par She reports having normal blood counts as of July but was found in Sept to have symptomatic anemia. She has received several transfusions.  She had also received IV iron infusions from her gastroenterologist.  Endoscopy, colonoscopy and capsule endoscopy were all negative and her GI believes that her NEGRA is unrelated to GI issues.  [FreeTextEntry1] : Parenteral B12 monthly [de-identified] : She presents for follow-up of B12 and iron deficiency anemias, receiving monthly dose of B12 parenterally.  She completed Venofer recently.  She is feeling better with more energy..  She denies rash, fever, infections, bleeding, bruising, nausea,vomiting,cough, SOB, chest pain, change in BM, headache or dizziness.

## 2021-07-11 NOTE — RESULTS/DATA
[FreeTextEntry1] : WBC 7.12\par Hemoglobin 14.6\par Hematocrit 45.6\par Platelet count 249,000 \par \par Creatinine 1.20\par EGFR 47

## 2021-07-11 NOTE — ASSESSMENT
[FreeTextEntry1] : This appears to be a relatively acute process as she reports hemoglobin levels of more than 13 g/dL in July from routine blood work performed at office visits.   Given how quickly and significantly her hemoglobin has dropped this past summer, to me this suggests more of an RBC loss/destructive process rather than an RBC production issue.  She has already been initiated on intravenous iron by her gastroenterologist and I will evaluate her hematopoietic potential once iron stores have been replete adequately.  She reports an essentially unremarkable bidirectional endoscopies and a recent capsule endoscopy.  I have requested the records from the above.\par I obtained a CBC with differential and a peripheral blood smear review.\par I obtained a complete hematological workup that included a CMP, LDH, haptoglobin, reticulocyte count, B12/MMA/folic acid levels, TSH, SPEP, SIEP, IgQ,serum free light chains, NATO,  RF, panel, ferritin level, and a Jose' test.  Her hemoglobin had improved to 11.3 g/dL, iron indices have mostly normalized, and her serum methylmalonic acid level was elevated.  Given this I initiated her on parenteral B12 and will monitor her iron indices.  Another contributing factor may be her chronic renal insufficiency.    At this time she does not warrant or qualify for LEVI therapy.   Her hemoglobin has improved to 14.6.  Continue monthly B12.  We will forward her labs to her nephrologist for renal insufficiency.  \par She completed Venofer recently\par Continue monthly parenteral B12\par Continue routine, age-appropriate, healthcare maintenance \par History of present illness, review of systems, physical exam and treatment plan reviewed with .\par Office visit in 4 weeks or prn for new or worsening symptoms.

## 2021-08-05 ENCOUNTER — APPOINTMENT (OUTPATIENT)
Dept: HEMATOLOGY ONCOLOGY | Facility: CLINIC | Age: 66
End: 2021-08-05
Payer: COMMERCIAL

## 2021-08-05 ENCOUNTER — LABORATORY RESULT (OUTPATIENT)
Age: 66
End: 2021-08-05

## 2021-08-05 VITALS
TEMPERATURE: 98.7 F | OXYGEN SATURATION: 96 % | SYSTOLIC BLOOD PRESSURE: 169 MMHG | HEIGHT: 62.5 IN | BODY MASS INDEX: 25.66 KG/M2 | RESPIRATION RATE: 18 BRPM | DIASTOLIC BLOOD PRESSURE: 72 MMHG | HEART RATE: 90 BPM | WEIGHT: 143 LBS

## 2021-08-05 PROCEDURE — 99214 OFFICE O/P EST MOD 30 MIN: CPT

## 2021-09-02 ENCOUNTER — APPOINTMENT (OUTPATIENT)
Dept: HEMATOLOGY ONCOLOGY | Facility: CLINIC | Age: 66
End: 2021-09-02
Payer: COMMERCIAL

## 2021-09-02 VITALS
DIASTOLIC BLOOD PRESSURE: 80 MMHG | BODY MASS INDEX: 25.48 KG/M2 | TEMPERATURE: 97.7 F | SYSTOLIC BLOOD PRESSURE: 170 MMHG | RESPIRATION RATE: 18 BRPM | OXYGEN SATURATION: 96 % | WEIGHT: 142 LBS | HEIGHT: 62.5 IN | HEART RATE: 89 BPM

## 2021-09-02 PROCEDURE — 99214 OFFICE O/P EST MOD 30 MIN: CPT

## 2021-10-07 ENCOUNTER — APPOINTMENT (OUTPATIENT)
Dept: HEMATOLOGY ONCOLOGY | Facility: CLINIC | Age: 66
End: 2021-10-07
Payer: COMMERCIAL

## 2021-10-07 VITALS
WEIGHT: 140 LBS | HEART RATE: 90 BPM | OXYGEN SATURATION: 95 % | RESPIRATION RATE: 18 BRPM | TEMPERATURE: 98.4 F | HEIGHT: 62.5 IN | DIASTOLIC BLOOD PRESSURE: 90 MMHG | SYSTOLIC BLOOD PRESSURE: 166 MMHG | BODY MASS INDEX: 25.12 KG/M2

## 2021-10-07 LAB
FERRITIN SERPL-MCNC: 255 NG/ML
IRON SATN MFR SERPL: 29 %
IRON SERPL-MCNC: 96 UG/DL
TIBC SERPL-MCNC: 328 UG/DL
UIBC SERPL-MCNC: 232 UG/DL

## 2021-10-07 PROCEDURE — 99213 OFFICE O/P EST LOW 20 MIN: CPT

## 2021-10-16 LAB
FERRITIN SERPL-MCNC: 215 NG/ML
IRON SATN MFR SERPL: 24 %
IRON SERPL-MCNC: 76 UG/DL
TIBC SERPL-MCNC: 315 UG/DL
UIBC SERPL-MCNC: 239 UG/DL

## 2021-10-16 NOTE — HISTORY OF PRESENT ILLNESS
[0 - No Distress] : Distress Level: 0 [de-identified] : Mrs. Manley is a 66 year old female who is referred by Dr.Michael Truong for second opinion for anemia. \par She reports having normal blood counts as of July but was found in Sept to have symptomatic anemia. She has received several transfusions.  She had also received IV iron infusions from her gastroenterologist.  Endoscopy, colonoscopy and capsule endoscopy were all negative and her GI believes that her NEGRA is unrelated to GI issues.  [FreeTextEntry1] : Parenteral B12 monthly [de-identified] : She presents for follow-up of B12 and iron deficiency anemias, receiving monthly dose of B12 parenterally.  She completed Venofer in May 2021.  She feels well.  She is feeling better with more energy.  She denies rash, fever, infections, bleeding, bruising, nausea,vomiting,cough, SOB, chest pain, change in BM, headache or dizziness.

## 2021-10-16 NOTE — ASSESSMENT
[FreeTextEntry1] : This appears to be a relatively acute process as she reports hemoglobin levels of more than 13 g/dL in July from routine blood work performed at office visits.   Given how quickly and significantly her hemoglobin has dropped this past summer, to me this suggests more of an RBC loss/destructive process rather than an RBC production issue.  She has already been initiated on intravenous iron by her gastroenterologist and I will evaluate her hematopoietic potential once iron stores have been replete adequately.  She reports an essentially unremarkable bidirectional endoscopies and a recent capsule endoscopy.  I have requested the records from the above.\par I obtained a CBC with differential and a peripheral blood smear review.\par I obtained a complete hematological workup that included a CMP, LDH, haptoglobin, reticulocyte count, B12/MMA/folic acid levels, TSH, SPEP, SIEP, IgQ,serum free light chains, NATO,  RF, panel, ferritin level, and a Jose' test.  Her hemoglobin had improved to 11.3 g/dL, iron indices have mostly normalized, and her serum methylmalonic acid level was elevated.  Given this I initiated her on parenteral B12 and will monitor her iron indices.  Another contributing factor may be her chronic renal insufficiency.    At this time she does not warrant or qualify for LEVI therapy.   Her hemoglobin 10.9 and her iron stores are normal..  Continue monthly B12.    \par She completed Venofer in May 2021\par Continue routine, age-appropriate, healthcare maintenance \par History of present illness, review of systems, physical exam and treatment plan reviewed with . \par Office visit in 4 weeks or prn for new or worsening symptoms.

## 2021-10-16 NOTE — RESULTS/DATA
[FreeTextEntry1] : WBC six 8.61\par Hemoglobin 13.9\par Hematocrit 42.2\par Platelet count 287,000\par Chemistry within normal limits

## 2021-11-04 ENCOUNTER — APPOINTMENT (OUTPATIENT)
Dept: HEMATOLOGY ONCOLOGY | Facility: CLINIC | Age: 66
End: 2021-11-04
Payer: COMMERCIAL

## 2021-11-04 VITALS
DIASTOLIC BLOOD PRESSURE: 73 MMHG | WEIGHT: 140 LBS | SYSTOLIC BLOOD PRESSURE: 165 MMHG | RESPIRATION RATE: 18 BRPM | OXYGEN SATURATION: 96 % | TEMPERATURE: 98.4 F | HEIGHT: 72 IN | BODY MASS INDEX: 18.96 KG/M2 | HEART RATE: 58 BPM

## 2021-11-04 PROCEDURE — 99213 OFFICE O/P EST LOW 20 MIN: CPT | Mod: 25

## 2021-11-04 PROCEDURE — 36415 COLL VENOUS BLD VENIPUNCTURE: CPT

## 2021-11-05 LAB
FERRITIN SERPL-MCNC: 215 NG/ML
IRON SATN MFR SERPL: 19 %
IRON SERPL-MCNC: 61 UG/DL
TIBC SERPL-MCNC: 321 UG/DL
UIBC SERPL-MCNC: 260 UG/DL

## 2021-11-05 NOTE — HISTORY OF PRESENT ILLNESS
[de-identified] : Mrs. Manley is a 66 year old female who is referred by Dr.Michael Truong for second opinion for anemia. \par She reports having normal blood counts as of July but was found in Sept to have symptomatic anemia. She has received several transfusions.  She had also received IV iron infusions from her gastroenterologist.  Endoscopy, colonoscopy and capsule endoscopy were all negative and her GI believes that her NEGRA is unrelated to GI issues.  [FreeTextEntry1] : Parenteral B12 monthly [de-identified] : She presents for follow-up of B12 and iron deficiency anemias, receiving monthly dose of B12 parenterally.  She continues to feel well. She last completed Venofer in May 2021.  She denies rash, fever, infections, bleeding, bruising, nausea,vomiting,cough, SOB, chest pain, change in BM, headache or dizziness.

## 2021-12-02 ENCOUNTER — APPOINTMENT (OUTPATIENT)
Dept: HEMATOLOGY ONCOLOGY | Facility: CLINIC | Age: 66
End: 2021-12-02
Payer: COMMERCIAL

## 2021-12-02 VITALS
HEART RATE: 85 BPM | TEMPERATURE: 98.1 F | OXYGEN SATURATION: 97 % | WEIGHT: 137 LBS | BODY MASS INDEX: 24.58 KG/M2 | SYSTOLIC BLOOD PRESSURE: 150 MMHG | DIASTOLIC BLOOD PRESSURE: 85 MMHG | RESPIRATION RATE: 18 BRPM | HEIGHT: 62.5 IN

## 2021-12-02 PROCEDURE — 99214 OFFICE O/P EST MOD 30 MIN: CPT

## 2021-12-02 NOTE — ASSESSMENT
[FreeTextEntry1] : This appears to be a relatively acute process as she reports hemoglobin levels of more than 13 g/dL in July from routine blood work performed at office visits.   Given how quickly and significantly her hemoglobin has dropped this past summer, to me this suggests more of an RBC loss/destructive process rather than an RBC production issue.  She has already been initiated on intravenous iron by her gastroenterologist and I will evaluate her hematopoietic potential once iron stores have been replete adequately.  She reports an essentially unremarkable bidirectional endoscopies and a recent capsule endoscopy.  I have requested the records from the above.\par I obtained a CBC with differential and a peripheral blood smear review.\par I obtained a complete hematological workup that included a CMP, LDH, haptoglobin, reticulocyte count, B12/MMA/folic acid levels, TSH, SPEP, SIEP, IgQ,serum free light chains, NATO,  RF, panel, ferritin level, and a Jose' test.  Her hemoglobin had improved to 11.3 g/dL, iron indices have mostly normalized, and her serum methylmalonic acid level was elevated.  Given this I initiated her on parenteral B12 and will monitor her iron indices.  Another contributing factor may be her chronic renal insufficiency.    At this time she does not warrant or qualify for LEVI therapy.   Her hemoglobin adequate and her iron stores are normal.. \par Continue monthly B12.    \par She completed Venofer in May 2021\par Continue routine, age-appropriate, healthcare maintenance  \par Office visit in 4 weeks or prn for new or worsening symptoms.

## 2021-12-02 NOTE — HISTORY OF PRESENT ILLNESS
[0 - No Distress] : Distress Level: 0 [de-identified] : Mrs. Manley is a 66 year old female who is referred by Dr.Michael Truong for second opinion for anemia. \par She reports having normal blood counts as of July but was found in Sept to have symptomatic anemia. She has received several transfusions.  She had also received IV iron infusions from her gastroenterologist.  Endoscopy, colonoscopy and capsule endoscopy were all negative and her GI believes that her NEGRA is unrelated to GI issues.  [FreeTextEntry1] : Parenteral B12 monthly [de-identified] : She presents for follow-up of B12 and iron deficiency anemias, receiving monthly dose of B12 parenterally.  She continues to feel well. She last completed Venofer in May 2021.  She denies rash, fever, infections, bleeding, bruising, nausea,vomiting,cough, SOB, chest pain, change in BM, headache or dizziness.   She reports that she was started on lisinopril by her nephrologist and that her proteinuria has fully resolved.

## 2022-01-12 ENCOUNTER — APPOINTMENT (OUTPATIENT)
Dept: HEMATOLOGY ONCOLOGY | Facility: CLINIC | Age: 67
End: 2022-01-12
Payer: COMMERCIAL

## 2022-01-12 VITALS
HEART RATE: 88 BPM | WEIGHT: 137 LBS | DIASTOLIC BLOOD PRESSURE: 79 MMHG | HEIGHT: 62.5 IN | SYSTOLIC BLOOD PRESSURE: 157 MMHG | TEMPERATURE: 98.7 F | RESPIRATION RATE: 16 BRPM | OXYGEN SATURATION: 96 % | BODY MASS INDEX: 24.58 KG/M2

## 2022-01-12 LAB
FERRITIN SERPL-MCNC: 159 NG/ML
IRON SATN MFR SERPL: 22 %
IRON SERPL-MCNC: 68 UG/DL
TIBC SERPL-MCNC: 311 UG/DL
UIBC SERPL-MCNC: 243 UG/DL

## 2022-01-12 PROCEDURE — 36415 COLL VENOUS BLD VENIPUNCTURE: CPT

## 2022-01-12 PROCEDURE — 99213 OFFICE O/P EST LOW 20 MIN: CPT | Mod: 25

## 2022-01-15 LAB
FERRITIN SERPL-MCNC: 158 NG/ML
IRON SATN MFR SERPL: 18 %
IRON SERPL-MCNC: 58 UG/DL
TIBC SERPL-MCNC: 332 UG/DL
UIBC SERPL-MCNC: 274 UG/DL

## 2022-01-15 NOTE — HISTORY OF PRESENT ILLNESS
[0 - No Distress] : Distress Level: 0 [de-identified] : Mrs. Manley is a 66 year old female who is referred by Dr.Michael Truong for second opinion for anemia. \par She reports having normal blood counts as of July but was found in Sept to have symptomatic anemia. She has received several transfusions.  She had also received IV iron infusions from her gastroenterologist.  Endoscopy, colonoscopy and capsule endoscopy were all negative and her GI believes that her NEGRA is unrelated to GI issues.  [FreeTextEntry1] : Parenteral B12 monthly [de-identified] : She presents for follow-up of B12 and iron deficiency anemias, receiving monthly dose of B12 parenterally.  She continues to feel well. She last completed Venofer in May 2021.  She reports that she was started on lisinopril by her nephrologist and that her proteinuria has fully resolved. She denies rash, fever, infections, bleeding, bruising, nausea,vomiting,cough, SOB, chest pain, change in BM, headache or dizziness.

## 2022-01-15 NOTE — RESULTS/DATA
[FreeTextEntry1] : WBC 7.03\par Hemoglobin 12.5\par Hematocrit 39.4\par Platelet count 305,000\par Creatinine 1.59\par EGFR 34

## 2022-01-15 NOTE — ASSESSMENT
[FreeTextEntry1] : This appears to be a relatively acute process as she reports hemoglobin levels of more than 13 g/dL in July from routine blood work performed at office visits.   Given how quickly and significantly her hemoglobin has dropped this past summer, to me this suggests more of an RBC loss/destructive process rather than an RBC production issue.  She has already been initiated on intravenous iron by her gastroenterologist and I will evaluate her hematopoietic potential once iron stores have been replete adequately.  She reports an essentially unremarkable bidirectional endoscopies and a recent capsule endoscopy.  I have requested the records from the above.\par I obtained a CBC with differential and a peripheral blood smear review.\par I obtained a complete hematological workup that included a CMP, LDH, haptoglobin, reticulocyte count, B12/MMA/folic acid levels, TSH, SPEP, SIEP, IgQ,serum free light chains, NATO,  RF, panel, ferritin level, and a Jose' test.  Her hemoglobin had improved to 11.3 g/dL, iron indices have mostly normalized, and her serum methylmalonic acid level was elevated.  Given this I initiated her on parenteral B12 and will monitor her iron indices.  Another contributing factor may be her chronic renal insufficiency.    At this time she does not warrant or qualify for LEVI therapy.   Her hemoglobin adequate and her iron stores are normal.. \par Continue monthly B12.    \par She completed Venofer in May 2021\par Continue routine, age-appropriate, healthcare maintenance \par History of present illness, review of systems, physical exam and treatment plan reviewed with .\par Office visit in 4 weeks or prn for new or worsening symptoms.

## 2022-02-09 ENCOUNTER — APPOINTMENT (OUTPATIENT)
Dept: HEMATOLOGY ONCOLOGY | Facility: CLINIC | Age: 67
End: 2022-02-09
Payer: COMMERCIAL

## 2022-02-09 VITALS
RESPIRATION RATE: 18 BRPM | HEART RATE: 100 BPM | OXYGEN SATURATION: 96 % | WEIGHT: 137 LBS | HEIGHT: 62.5 IN | SYSTOLIC BLOOD PRESSURE: 147 MMHG | BODY MASS INDEX: 24.58 KG/M2 | TEMPERATURE: 98.4 F | DIASTOLIC BLOOD PRESSURE: 79 MMHG

## 2022-02-09 PROCEDURE — 99214 OFFICE O/P EST MOD 30 MIN: CPT

## 2022-02-09 NOTE — HISTORY OF PRESENT ILLNESS
[de-identified] : Mrs. Manley is a 66 year old female who is referred by Dr.Michael Truong for second opinion for anemia. \par She reports having normal blood counts as of July but was found in Sept to have symptomatic anemia. She has received several transfusions.  She had also received IV iron infusions from her gastroenterologist.  Endoscopy, colonoscopy and capsule endoscopy were all negative and her GI believes that her NEGRA is unrelated to GI issues.  [FreeTextEntry1] : Parenteral B12 monthly [de-identified] : She presents for follow-up of B12 and iron deficiency anemias, receiving monthly dose of B12 parenterally.  She continues to feel well. She last completed Venofer in May 2021.  She reports that she was started on lisinopril by her nephrologist and that her proteinuria has fully resolved. She denies rash, fever, infections, bleeding, bruising, nausea,vomiting,cough, SOB, chest pain, change in BM, headache or dizziness.

## 2022-02-09 NOTE — ASSESSMENT
[FreeTextEntry1] : This appears to be a relatively acute process as she reports hemoglobin levels of more than 13 g/dL in July from routine blood work performed at office visits.   Given how quickly and significantly her hemoglobin has dropped this past summer, to me this suggests more of an RBC loss/destructive process rather than an RBC production issue.  She has already been initiated on intravenous iron by her gastroenterologist and I will evaluate her hematopoietic potential once iron stores have been replete adequately.  She reports an essentially unremarkable bidirectional endoscopies and a recent capsule endoscopy.  I have requested the records from the above.\par I obtained a CBC with differential and a peripheral blood smear review.\par I obtained a complete hematological workup that included a CMP, LDH, haptoglobin, reticulocyte count, B12/MMA/folic acid levels, TSH, SPEP, SIEP, IgQ,serum free light chains, NATO,  RF, panel, ferritin level, and a Jose' test.  Her hemoglobin had improved to, iron indices have mostly normalized, and her serum methylmalonic acid level was elevated.  Given this I initiated her on parenteral B12 and will monitor her iron indices.  Another contributing factor may be her chronic renal insufficiency.    At this time she does not warrant or qualify for LEVI therapy.   Her hemoglobin adequate and her iron stores are normal.\par Continue monthly B12.    \par She completed Venofer in May 2021\par Continue routine, age-appropriate, healthcare maintenance \par Office visit in 4 weeks or prn for new or worsening symptoms.

## 2022-03-07 ENCOUNTER — APPOINTMENT (OUTPATIENT)
Dept: HEMATOLOGY ONCOLOGY | Facility: CLINIC | Age: 67
End: 2022-03-07
Payer: COMMERCIAL

## 2022-03-07 VITALS
HEART RATE: 100 BPM | DIASTOLIC BLOOD PRESSURE: 73 MMHG | WEIGHT: 134 LBS | HEIGHT: 62.5 IN | BODY MASS INDEX: 24.04 KG/M2 | SYSTOLIC BLOOD PRESSURE: 150 MMHG | TEMPERATURE: 97.9 F | RESPIRATION RATE: 18 BRPM | OXYGEN SATURATION: 98 %

## 2022-03-07 DIAGNOSIS — R79.89 OTHER SPECIFIED ABNORMAL FINDINGS OF BLOOD CHEMISTRY: ICD-10-CM

## 2022-03-07 PROCEDURE — 99214 OFFICE O/P EST MOD 30 MIN: CPT

## 2022-03-07 NOTE — REVIEW OF SYSTEMS
[FreeTextEntry2] : Significantly worsened. [FreeTextEntry7] : Dark stools but reports these have been chronically so.

## 2022-03-07 NOTE — HISTORY OF PRESENT ILLNESS
[de-identified] : Mrs. Manley is a 66 year old female who is referred by Dr.Michael Truong for second opinion for anemia. \par She reports having normal blood counts as of July but was found in Sept to have symptomatic anemia. She has received several transfusions.  She had also received IV iron infusions from her gastroenterologist.  Endoscopy, colonoscopy and capsule endoscopy were all negative and her GI believes that her NEGRA is unrelated to GI issues.  [FreeTextEntry1] : Parenteral B12 monthly [de-identified] : She presents for follow-up of B12 and iron deficiency anemias, receiving monthly dose of B12 parenterally.  She reports feeling significantly more fatigued and experiencing more dyspnea on exertion.  She reports having followed up with your primary care physician 1 week ago and blood work revealed a hemoglobin of 9.1 g/dL.  She last completed Venofer in May 2021.  She denies rash, fever, infections, bleeding, bruising, nausea,vomiting,cough, SOB, chest pain, change in BM, headache or dizziness.  She reports that her stools are dark but have been so for quite some time before.

## 2022-03-17 ENCOUNTER — APPOINTMENT (OUTPATIENT)
Dept: HEMATOLOGY ONCOLOGY | Facility: CLINIC | Age: 67
End: 2022-03-17
Payer: COMMERCIAL

## 2022-03-17 VITALS
OXYGEN SATURATION: 96 % | HEART RATE: 116 BPM | DIASTOLIC BLOOD PRESSURE: 79 MMHG | HEIGHT: 62.5 IN | TEMPERATURE: 97.3 F | BODY MASS INDEX: 23.68 KG/M2 | RESPIRATION RATE: 18 BRPM | SYSTOLIC BLOOD PRESSURE: 130 MMHG | WEIGHT: 132 LBS

## 2022-03-17 PROCEDURE — 99214 OFFICE O/P EST MOD 30 MIN: CPT | Mod: 25

## 2022-03-17 PROCEDURE — 36415 COLL VENOUS BLD VENIPUNCTURE: CPT

## 2022-03-19 LAB
ALBUMIN MFR SERPL ELPH: 58.8 %
ALBUMIN SERPL-MCNC: 3.9 G/DL
ALBUMIN/GLOB SERPL: 1.4 RATIO
ALPHA1 GLOB MFR SERPL ELPH: 5.1 %
ALPHA1 GLOB SERPL ELPH-MCNC: 0.3 G/DL
ALPHA2 GLOB MFR SERPL ELPH: 12.7 %
ALPHA2 GLOB SERPL ELPH-MCNC: 0.9 G/DL
B-GLOBULIN MFR SERPL ELPH: 12.6 %
B-GLOBULIN SERPL ELPH-MCNC: 0.8 G/DL
DEPRECATED KAPPA LC FREE/LAMBDA SER: 2.52 RATIO
DEPRECATED KAPPA LC FREE/LAMBDA SER: 2.52 RATIO
ERYTHROCYTE [SEDIMENTATION RATE] IN BLOOD BY WESTERGREN METHOD: 9 MM/HR
FERRITIN SERPL-MCNC: 55 NG/ML
FOLATE SERPL-MCNC: >20 NG/ML
GAMMA GLOB FLD ELPH-MCNC: 0.7 G/DL
GAMMA GLOB MFR SERPL ELPH: 10.8 %
HAPTOGLOB SERPL-MCNC: 194 MG/DL
IGA SER QL IEP: 242 MG/DL
IGG SER QL IEP: 735 MG/DL
IGM SER QL IEP: 45 MG/DL
INTERPRETATION SERPL IEP-IMP: NORMAL
IRON SATN MFR SERPL: 12 %
IRON SERPL-MCNC: 42 UG/DL
KAPPA LC CSF-MCNC: 4.54 MG/DL
KAPPA LC CSF-MCNC: 4.54 MG/DL
KAPPA LC SERPL-MCNC: 11.46 MG/DL
KAPPA LC SERPL-MCNC: 11.46 MG/DL
LDH SERPL-CCNC: 196 U/L
M PROTEIN SPEC IFE-MCNC: NORMAL
METHYLMALONATE SERPL-SCNC: 344 NMOL/L
PROT SERPL-MCNC: 6.7 G/DL
PROT SERPL-MCNC: 6.7 G/DL
RBC # BLD: 2.72 M/UL
RETICS # AUTO: 6.2 %
RETICS AGGREG/RBC NFR: 168.9 K/UL
TIBC SERPL-MCNC: 341 UG/DL
TSH SERPL-ACNC: 2.3 UIU/ML
UIBC SERPL-MCNC: 299 UG/DL
VIT B12 SERPL-MCNC: 1578 PG/ML

## 2022-03-21 NOTE — HISTORY OF PRESENT ILLNESS
[0 - No Distress] : Distress Level: 0 [de-identified] : Mrs. Manley is a 66 year old female who is referred by Dr.Michael Truong for second opinion for anemia. \par She reports having normal blood counts as of July but was found in Sept to have symptomatic anemia. She has received several transfusions.  She had also received IV iron infusions from her gastroenterologist.  Endoscopy, colonoscopy and capsule endoscopy were all negative and her GI believes that her NEGRA is unrelated to GI issues.  [FreeTextEntry1] : Parenteral B12 monthly [de-identified] : She presents for follow-up of B12 and iron deficiency anemias, receiving monthly dose of B12 parenterally.  She is receiving Venofer 4 out of 5 today. She reports feeling significantly more fatigued and experiencing more dyspnea on exertion over the past 3 weeks.  She also reports having dark stools and was encouraged at her last visit to follow-up with her GI.  Her last colonoscopy was in 2020. She denies rash, fever, infections, bleeding, bruising, nausea,vomiting,cough, SOB, chest pain, change in BM, headache or dizziness.

## 2022-03-21 NOTE — REVIEW OF SYSTEMS
[Fatigue] : fatigue [Negative] : Allergic/Immunologic [FreeTextEntry2] : Significantly worsened. [FreeTextEntry7] : Dark stools but reports these have been chronically so.

## 2022-03-21 NOTE — ASSESSMENT
[FreeTextEntry1] : This appears to be a relatively acute process as she reports hemoglobin levels of more than 13 g/dL in July from routine blood work performed at office visits.   Given how quickly and significantly her hemoglobin has dropped this past summer, to me this suggests more of an RBC loss/destructive process rather than an RBC production issue.  She has already been initiated on intravenous iron by her gastroenterologist and I will evaluate her hematopoietic potential once iron stores have been replete adequately.  She reports an essentially unremarkable bidirectional endoscopies and a recent capsule endoscopy.  I have requested the records from the above.\par I obtained a CBC with differential and a peripheral blood smear review.\par I obtained a complete hematological workup that included a CMP, LDH, haptoglobin, reticulocyte count, B12/MMA/folic acid levels, TSH, SPEP, SIEP, IgQ,serum free light chains, NATO,  RF, panel, ferritin level, and a Jose' test.  Her hemoglobin had improved to, iron indices have mostly normalized, and her serum methylmalonic acid level was elevated.  Given this I initiated her on parenteral B12 and will monitor her iron indices.  Another contributing factor may be her chronic renal insufficiency.   She has had a nearly 2-1/2 g/dL drop in her hemoglobin since her last visit 4 weeks ago.  At that time her iron indices were within normal limits.  I will obtain a complete hematological work-up but given the precipitous drop I am worried for continued blood losses.  I will initiate her on Venofer given the symptomatic nature and to try to avoid transfusional support.  I will also forward her lab work to her gastroenterologist and have asked the patient to follow-up with them.\par Continue monthly B12.    \par She is receiving Venofer 4/5 today.\par Her hemoglobin today is 7.5.  We will arrange for 1 unit PRBC.  She is again urged to follow-up with her GI and records will be forwarded to him as well as her nephrologist.\par Continue routine, age-appropriate, healthcare maintenance \par History of present illness, review of systems, physical exam and treatment plan reviewed with .\par Office visit in 1 week or prn for new or worsening symptoms.

## 2022-03-21 NOTE — RESULTS/DATA
[FreeTextEntry1] : WBC 8.97\par Hemoglobin 7.5\par Hematocrit 24.5\par Platelet count 346,000\par Creatinine 1.64\par EGFR 32

## 2022-03-22 ENCOUNTER — APPOINTMENT (OUTPATIENT)
Dept: GASTROENTEROLOGY | Facility: CLINIC | Age: 67
End: 2022-03-22
Payer: MEDICARE

## 2022-03-22 VITALS
OXYGEN SATURATION: 98 % | TEMPERATURE: 98 F | RESPIRATION RATE: 18 BRPM | HEART RATE: 65 BPM | SYSTOLIC BLOOD PRESSURE: 144 MMHG | DIASTOLIC BLOOD PRESSURE: 80 MMHG | HEIGHT: 62 IN | BODY MASS INDEX: 24.29 KG/M2 | WEIGHT: 132 LBS

## 2022-03-22 DIAGNOSIS — Z86.010 PERSONAL HISTORY OF COLONIC POLYPS: ICD-10-CM

## 2022-03-22 DIAGNOSIS — Z80.0 ENCOUNTER FOR SCREENING FOR MALIGNANT NEOPLASM OF COLON: ICD-10-CM

## 2022-03-22 DIAGNOSIS — Z12.11 ENCOUNTER FOR SCREENING FOR MALIGNANT NEOPLASM OF COLON: ICD-10-CM

## 2022-03-22 PROCEDURE — 99204 OFFICE O/P NEW MOD 45 MIN: CPT

## 2022-03-22 NOTE — HISTORY OF PRESENT ILLNESS
[FreeTextEntry1] : 67 yo F hx stroke 2011 (on , plavix 75 mg), HTN, HLD, on chronic PPI due to anemia in the past,  referred for evaluation of iron def anemia by her hematologist. \par \par Two years ago, developed anemia, required 9 blood transfusions. Underwent EGD/Colonoscopy/ capsule endoscopy 2 years ago- EGD with grade A esophagitis, Hiatal hernia, schatzkis ring. Colonoscopy with multiple polyps, Capsule normal per patient.  Was started on PPI protonix 40 mg qd. Then had IV iron from GI. \par \par Last thursday was anemic again to hb 7.5.   \par 3/19 went to [facility] ER - had 1 Unit of blood.  Stool occult blood POSITIVE. \par  just finished 5 iron infusions. \par \par Gets b12 shots once a month for 2 years. \par Takes 2 iron pills by mouth for 2 years. Reports the stool is always black, attributes this to the iron pills. \par Denies abd pain, n/v/d/c/gerd/dysphagia. \par Denies seeing red or maroon blood in stool, denies nose bleeds, denies vaginal bleeding. \par Noticed reduced appetite. \par \par Last colonoscopy: 2020 - multiple polyps.\par \par Family Hx: Father with colon cancer age 70\par \par ROS:\par constitutional: no weight loss, fevers\par ENT: no deafness\par Eyes: no blindness\par Neck: no lymphadenopathy\par Chest: no shortness of breath, no cough\par Cardiac: no chest pain, no palpitations\par Vascular: no leg swelling\par GI: no abdominal pain, nausea, vomiting, diarrhea, constipation, rectal bleeding, melena, dysphagia,  unless otherwise noted in HPI\par : no dysuria, dark urine\par Skin: no rashes, lesions, jaundice\par Heme: no bleeding\par Endocrine: no DM  unless otherwise stated in HPI\par \par Physical Exam: (VS noted below)\par General: alert, comfortable, in no acute distress\par Eyes: normal sclera, anicteric\par Neck: normal, supple, no neck mass\par Pulm: no respiratory distress, clear to auscultation bilaterally \par Heart: RRR, normal S1 S2\par Abd: Soft, non-tender, non-distended, normal bowel sounds, no appreciable hepatosplenomegaly, no masses palpated\par Rectal: deferred\par Ext: warm and well perfused, no edema\par Skin: no rashes, no juandice\par Neuro: alert, grossly nonfocal\par \par Labs/imaging/prior endoscopic results were reviewed to the extent available and pertinent findings noted in HPI\par \par \par

## 2022-03-22 NOTE — CONSULT LETTER
[Dear  ___] : Dear  [unfilled], [Consult Letter:] : I had the pleasure of evaluating your patient, [unfilled]. [Please see my note below.] : Please see my note below. [Consult Closing:] : Thank you very much for allowing me to participate in the care of this patient.  If you have any questions, please do not hesitate to contact me. [Sincerely,] : Sincerely, [FreeTextEntry3] : Antonia Hay M.D.\par

## 2022-03-22 NOTE — ASSESSMENT
[FreeTextEntry1] : 67 yo F hx CVA 2011, L common carotid artery stent,  on dual antiplt therapy ( and plavix 75), HTN, HLD, here for evaluation of chronic anemia (b12 and iron), + occult blood in stool. \par Prior EGD/colonoscopy/capsule negative about 2 years ago. On chronic PPI for esophagitis.  \par \par Ongoing recurrent NEGRA require blood transfusion, recommend repeat EGD/COlonoscopy. Possible capsule if above neg. \par will need to stop plavix 5 d prior to procedure \par will ask her neurologist Dr. Jerad Herrmann \par \par Will plan on an upper endoscopy for iron def anemia.  Explained nature of procedure and the risks/benefits/alternative including but not limited to bleeding, infection, perforation, missed lesions, anesthesia complications. Patient understands and agrees, all questions answered.\par \par Will plan on a colonoscopy for iron def anemia, history colon polyps. Explained nature of procedure and risks/benefits/alternatives including but not limited to bleeding, infection, perforation, missed lesions, anesthesia complications. Patient understands and agrees, all questions answered. Will use a split dose miralax cleanse with clears the day prior.\par

## 2022-03-23 ENCOUNTER — APPOINTMENT (OUTPATIENT)
Dept: HEMATOLOGY ONCOLOGY | Facility: CLINIC | Age: 67
End: 2022-03-23
Payer: COMMERCIAL

## 2022-03-23 VITALS
BODY MASS INDEX: 23.92 KG/M2 | HEIGHT: 62 IN | TEMPERATURE: 98.2 F | WEIGHT: 130 LBS | HEART RATE: 110 BPM | RESPIRATION RATE: 18 BRPM | DIASTOLIC BLOOD PRESSURE: 69 MMHG | OXYGEN SATURATION: 98 % | SYSTOLIC BLOOD PRESSURE: 141 MMHG

## 2022-03-23 PROCEDURE — 99214 OFFICE O/P EST MOD 30 MIN: CPT

## 2022-03-23 NOTE — ASSESSMENT
[FreeTextEntry1] : This appears to be a relatively acute process as she reports hemoglobin levels of more than 13 g/dL in July from routine blood work performed at office visits.   Given how quickly and significantly her hemoglobin has dropped this past summer, to me this suggests more of an RBC loss/destructive process rather than an RBC production issue.  She has already been initiated on intravenous iron by her gastroenterologist and I will evaluate her hematopoietic potential once iron stores have been replete adequately.  She reports an essentially unremarkable bidirectional endoscopies and a recent capsule endoscopy.  I have requested the records from the above.\par I obtained a CBC with differential and a peripheral blood smear review.\par I obtained a complete hematological workup that included a CMP, LDH, haptoglobin, reticulocyte count, B12/MMA/folic acid levels, TSH, SPEP, SIEP, IgQ,serum free light chains, NATO,  RF, panel, ferritin level, and a Jose' test.  Her hemoglobin had improved to, iron indices have mostly normalized, and her serum methylmalonic acid level was elevated.  Given this I initiated her on parenteral B12 and will monitor her iron indices.  Another contributing factor may be her chronic renal insufficiency.   She has had a nearly 2-1/2 g/dL drop in her hemoglobin since her last visit 4 weeks ago.  At that time her iron indices were within normal limits.  I will obtain a complete hematological work-up but given the precipitous drop I am worried for continued blood losses.  I will initiate her on Venofer given the symptomatic nature and to try to avoid transfusional support.   She has met with Dr. Hay earlier this week and has been scheduled for a bidirectional endo on 3/28/22.  Her antiplatelet meds are also on hold.\par Continue monthly B12.    \par She is receiving Venofer.\par Office visit in 1 week or prn for new or worsening symptoms.

## 2022-03-23 NOTE — HISTORY OF PRESENT ILLNESS
[0 - No Distress] : Distress Level: 0 [de-identified] : Mrs. Manley is a 66 year old female who is referred by Dr.Michael Truong for second opinion for anemia. \par She reports having normal blood counts as of July but was found in Sept to have symptomatic anemia. She has received several transfusions.  She had also received IV iron infusions from her gastroenterologist.  Endoscopy, colonoscopy and capsule endoscopy were all negative and her GI believes that her NEGRA is unrelated to GI issues.  [FreeTextEntry1] : Parenteral B12 monthly [de-identified] : She presents for follow-up of B12 and iron deficiency anemias, receiving monthly dose of B12 parenterally.  She is receiving Venofer. She reports feeling significantly more fatigued and experiencing more dyspnea on exertion over the past 3 weeks.  She also reports having dark stools and was encouraged at her last visit to follow-up with her GI.  Her last colonoscopy was in 2020. She denies rash, fever, infections, bleeding, bruising, nausea,vomiting,cough, SOB, chest pain, change in BM, headache or dizziness.

## 2022-03-24 ENCOUNTER — NON-APPOINTMENT (OUTPATIENT)
Age: 67
End: 2022-03-24

## 2022-03-27 ENCOUNTER — RESULT REVIEW (OUTPATIENT)
Age: 67
End: 2022-03-27

## 2022-03-28 ENCOUNTER — APPOINTMENT (OUTPATIENT)
Dept: GASTROENTEROLOGY | Facility: HOSPITAL | Age: 67
End: 2022-03-28
Payer: COMMERCIAL

## 2022-03-28 PROCEDURE — 45380 COLONOSCOPY AND BIOPSY: CPT | Mod: 59

## 2022-03-28 PROCEDURE — 45385 COLONOSCOPY W/LESION REMOVAL: CPT

## 2022-03-28 PROCEDURE — 45382 COLONOSCOPY W/CONTROL BLEED: CPT | Mod: 59

## 2022-03-30 ENCOUNTER — APPOINTMENT (OUTPATIENT)
Dept: HEMATOLOGY ONCOLOGY | Facility: CLINIC | Age: 67
End: 2022-03-30
Payer: COMMERCIAL

## 2022-03-31 ENCOUNTER — APPOINTMENT (OUTPATIENT)
Dept: HEMATOLOGY ONCOLOGY | Facility: CLINIC | Age: 67
End: 2022-03-31
Payer: COMMERCIAL

## 2022-03-31 VITALS
DIASTOLIC BLOOD PRESSURE: 70 MMHG | HEIGHT: 62 IN | RESPIRATION RATE: 18 BRPM | WEIGHT: 130 LBS | SYSTOLIC BLOOD PRESSURE: 148 MMHG | HEART RATE: 96 BPM | BODY MASS INDEX: 23.92 KG/M2 | OXYGEN SATURATION: 97 % | TEMPERATURE: 98.3 F

## 2022-03-31 PROCEDURE — 36415 COLL VENOUS BLD VENIPUNCTURE: CPT

## 2022-03-31 PROCEDURE — 99214 OFFICE O/P EST MOD 30 MIN: CPT | Mod: 25

## 2022-03-31 NOTE — ASSESSMENT
[FreeTextEntry1] : This appears to be a relatively acute process as she reports hemoglobin levels of more than 13 g/dL in July from routine blood work performed at office visits.   Given how quickly and significantly her hemoglobin has dropped this past summer, to me this suggests more of an RBC loss/destructive process rather than an RBC production issue.  She has already been initiated on intravenous iron by her gastroenterologist and I will evaluate her hematopoietic potential once iron stores have been replete adequately.  She reports an essentially unremarkable bidirectional endoscopies and a recent capsule endoscopy.  I have requested the records from the above.\par I obtained a CBC with differential and a peripheral blood smear review.\par I obtained a complete hematological workup that included a CMP, LDH, haptoglobin, reticulocyte count, B12/MMA/folic acid levels, TSH, SPEP, SIEP, IgQ,serum free light chains, NATO,  RF, panel, ferritin level, and a Jose' test.  Her hemoglobin had improved to, iron indices have mostly normalized, and her serum methylmalonic acid level was elevated.  Given this I initiated her on parenteral B12 and will monitor her iron indices.  Another contributing factor may be her chronic renal insufficiency.   She has had a nearly 2-1/2 g/dL drop in her hemoglobin since her last visit 4 weeks ago.  At that time her iron indices were within normal limits.  I will obtain a complete hematological work-up but given the precipitous drop I am worried for continued blood losses.  I will initiate her on Venofer given the symptomatic nature and to try to avoid transfusional support.   She has met with Dr. Hay earlier this week and has been scheduled for a bidirectional endo on 3/28/22.  Her antiplatelet meds are also on hold.\par She is status post admission to the ED where she underwent endoscopy which showed normal esophagus, small hiatal hernia, gastritis and normal duodenum.  Colonoscopy with Dr. Hay which demonstrated polyps in the sigmoid colon, diverticulosis of the sigmoid colon and multiple scattered erosions with dried blood in the ascending colon.  She will be following up with Dr. Hay to review the results in 1 week.\par She completed Venofer on March 22, 2022.  Reviewed available labs.  Her hemoglobin has improved to 10.7.  She has mild renal insufficiency this will be forwarded to her PCP.\par Continue monthly B12.    \par Continue routine, age-appropriate, healthcare maintenance \par History of present illness, review of systems, physical exam and treatment plan reviewed with .\par Office visit in 2 week or prn for new or worsening symptoms.

## 2022-03-31 NOTE — RESULTS/DATA
[FreeTextEntry1] : WBC 8.66\par Hemoglobin 10.7\par Hematocrit 34.1\par Platelet count 354,000\par Creatinine 1.48\par EGFR 37

## 2022-04-06 ENCOUNTER — APPOINTMENT (OUTPATIENT)
Dept: GASTROENTEROLOGY | Facility: CLINIC | Age: 67
End: 2022-04-06
Payer: COMMERCIAL

## 2022-04-06 VITALS
DIASTOLIC BLOOD PRESSURE: 78 MMHG | BODY MASS INDEX: 23.92 KG/M2 | HEART RATE: 89 BPM | RESPIRATION RATE: 16 BRPM | WEIGHT: 130 LBS | SYSTOLIC BLOOD PRESSURE: 120 MMHG | HEIGHT: 62 IN | OXYGEN SATURATION: 98 %

## 2022-04-06 PROCEDURE — 99213 OFFICE O/P EST LOW 20 MIN: CPT

## 2022-04-06 NOTE — ASSESSMENT
[FreeTextEntry1] : \par #iron def anemia, slow GI blood loss- likely due to colon erosions that were oozing, high dose aspirin\par - iron replacement\par - avoid NSAIDs\par - ok for ASA 81 and plavix\par - f/u with neurologist - consider single agent anti-plt-  just plavix perhaps to avoid nsaids all together\par -f/u w hematology to monitor - has f/u on the 21st. \par \par -if re-bleeds - will need capsule endoscopy \par \par #Tubular adenoma\par - repeat colonoscopy in 3 years\par  \par f/u 3 mo\par

## 2022-04-06 NOTE — HISTORY OF PRESENT ILLNESS
[FreeTextEntry1] : Here for follow up for GI bleeding. \par Was having melena and diarrhea so went to ER- EGD at [facility] 3/26 - gastritis\par Colonoscopy 3/28 - multiple erosions in ascending colon (recent signs of bleeding), polyps removed\par Path: TAs, nonspecific inflammation mild chrnic and focal acute with focal erosion likely due to NSAIDs ( was on high aspirin). \par \par she is now taking aspirin 81 (not 325) and plavix 75. \par taking iron twice a day\par \par \par Last colonoscopy: ascending colon erosions, polyps \par \par Soc: no tobacco or significant EtOH\par \par Family Hx: no significant GI family history including colon cancer, stomach cancer, IBD, celiac\par \par ROS:\par constitutional: no weight loss, fevers\par ENT: no deafness\par Eyes: no blindness\par Neck: no lymphadenopathy\par Chest: no shortness of breath, no cough\par Cardiac: no chest pain, no palpitations\par Vascular: no leg swelling\par GI: no abdominal pain, nausea, vomiting, diarrhea, constipation, rectal bleeding, melena, dysphagia,  unless otherwise noted in HPI\par : no dysuria, dark urine\par Skin: no rashes, lesions, jaundice\par Heme: no bleeding\par Endocrine: no DM  unless otherwise stated in HPI\par \par Physical Exam: (VS noted below)\par General: alert, comfortable, in no acute distress\par Eyes: normal sclera, anicteric\par Neck: normal, supple, no neck mass\par Pulm: no respiratory distress, clear to auscultation bilaterally \par Heart: RRR, normal S1 S2\par Abd: Soft, non-tender, non-distended, normal bowel sounds, no appreciable hepatosplenomegaly, no masses palpated\par Rectal: deferred\par Ext: warm and well perfused, no edema\par Skin: no rashes, no juandice\par Neuro: alert, grossly nonfocal\par \par Labs/imaging/prior endoscopic results were reviewed to the extent available and pertinent findings noted in HPI\par

## 2022-04-14 ENCOUNTER — APPOINTMENT (OUTPATIENT)
Dept: HEMATOLOGY ONCOLOGY | Facility: CLINIC | Age: 67
End: 2022-04-14
Payer: COMMERCIAL

## 2022-04-14 VITALS
WEIGHT: 130 LBS | DIASTOLIC BLOOD PRESSURE: 59 MMHG | HEIGHT: 62 IN | RESPIRATION RATE: 18 BRPM | OXYGEN SATURATION: 95 % | SYSTOLIC BLOOD PRESSURE: 130 MMHG | BODY MASS INDEX: 23.92 KG/M2 | TEMPERATURE: 98.4 F | HEART RATE: 106 BPM

## 2022-04-14 DIAGNOSIS — K63.3 ULCER OF INTESTINE: ICD-10-CM

## 2022-04-14 DIAGNOSIS — R25.2 CRAMP AND SPASM: ICD-10-CM

## 2022-04-14 LAB
FERRITIN SERPL-MCNC: 473 NG/ML
IRON SATN MFR SERPL: 13 %
IRON SERPL-MCNC: 40 UG/DL
TIBC SERPL-MCNC: 319 UG/DL
UIBC SERPL-MCNC: 279 UG/DL

## 2022-04-14 PROCEDURE — 99214 OFFICE O/P EST MOD 30 MIN: CPT | Mod: 25

## 2022-04-14 PROCEDURE — 36415 COLL VENOUS BLD VENIPUNCTURE: CPT

## 2022-04-14 RX ORDER — ASPIRIN 325 MG/1
325 TABLET ORAL
Refills: 0 | Status: DISCONTINUED | COMMUNITY
End: 2022-04-14

## 2022-04-14 RX ORDER — POTASSIUM CHLORIDE 1500 MG/1
20 TABLET, EXTENDED RELEASE ORAL
Qty: 60 | Refills: 0 | Status: DISCONTINUED | COMMUNITY
Start: 2021-05-27 | End: 2022-04-14

## 2022-04-14 RX ORDER — POTASSIUM 75 MG
TABLET ORAL
Refills: 0 | Status: DISCONTINUED | COMMUNITY
End: 2022-04-14

## 2022-04-14 RX ORDER — OMEGA-3/DHA/EPA/FISH OIL 300-1000MG
CAPSULE ORAL
Refills: 0 | Status: DISCONTINUED | COMMUNITY
End: 2022-04-14

## 2022-04-14 RX ORDER — MULTIVIT-MIN/IRON/FOLIC ACID/K 18-600-40
400 CAPSULE ORAL
Refills: 0 | Status: DISCONTINUED | COMMUNITY
End: 2022-04-14

## 2022-04-14 NOTE — RESULTS/DATA
[FreeTextEntry1] : WBC 7.59\par Hemoglobin 8.2 (10.7 on 3/31/2022)\par Hematocrit 26.1\par Platelet count 332,000

## 2022-04-14 NOTE — HISTORY OF PRESENT ILLNESS
[0 - No Distress] : Distress Level: 0 [de-identified] : Mrs. Manley is a 67 year old female who is referred by Dr.Michael Truong for second opinion for anemia. \par She reports having normal blood counts as of July but was found in Sept to have symptomatic anemia. She has received several transfusions.  She had also received IV iron infusions from her gastroenterologist.  Endoscopy, colonoscopy and capsule endoscopy were all negative and her GI believes that her NEGRA is unrelated to GI issues.  [FreeTextEntry1] : Parenteral B12 monthly [de-identified] : She presents for follow-up of B12 and iron deficiency anemias, receiving monthly dose of B12 parenterally.  She completed Venofer on 3/22/2022.  She presented to the emergency department on March 26, 2022 for worsening weakness, and shortness of breath and black tarry stool. over the past week.   While in the hospital she underwent EGD which showed normal esophagus, small hiatal hernia, gastritis and normal duodenum.  She then underwent colonoscopy on March 28, 2022 at [facility] which showed polyps in the sigmoid colon, diverticulosis of the sigmoid colon and multiple scattered erosions with dried blood in the ascending colon. Her last colonoscopy was in 2020.  She was seen by GI and it was decided that her aspirin would be discontinued she was asked to avoid NSAIDs but she continues on Plavix.  She recently went to the emergency department for painful corn on the side of her foot.  She states that it was infected and she was started on antibiotics.  It continues to be uncomfortable but it is getting rechecked next week.  She reports having leg discomfort.  She denies rash, fever, infections, bleeding, bruising, nausea,vomiting,cough, SOB, chest pain, change in BM, headache or dizziness.

## 2022-04-14 NOTE — REVIEW OF SYSTEMS
[Fatigue] : fatigue [Negative] : Allergic/Immunologic [FreeTextEntry7] : Dark stools but reports these have been chronically so. [FreeTextEntry9] : Leg discomfort/ cramps

## 2022-04-14 NOTE — ASSESSMENT
[FreeTextEntry1] : This appears to be a relatively acute process as she reports hemoglobin levels of more than 13 g/dL in July from routine blood work performed at office visits.   Given how quickly and significantly her hemoglobin has dropped this past summer, to me this suggests more of an RBC loss/destructive process rather than an RBC production issue.  She has already been initiated on intravenous iron by her gastroenterologist and I will evaluate her hematopoietic potential once iron stores have been replete adequately.  She reports an essentially unremarkable bidirectional endoscopies and a recent capsule endoscopy.  I have requested the records from the above.\par I obtained a CBC with differential and a peripheral blood smear review.\par I obtained a complete hematological workup that included a CMP, LDH, haptoglobin, reticulocyte count, B12/MMA/folic acid levels, TSH, SPEP, SIEP, IgQ,serum free light chains, NATO,  RF, panel, ferritin level, and a Jose' test.  Her hemoglobin had improved to, iron indices have mostly normalized, and her serum methylmalonic acid level was elevated.  Given this I initiated her on parenteral B12 and will monitor her iron indices.  Another contributing factor may be her chronic renal insufficiency.   She has had a nearly 2-1/2 g/dL drop in her hemoglobin since her last visit 4 weeks ago.  At that time her iron indices were within normal limits.  I will obtain a complete hematological work-up but given the precipitous drop I am worried for continued blood losses.  I will initiate her on Venofer given the symptomatic nature and to try to avoid transfusional support.   She has met with Dr. Hay earlier this week and has been scheduled for a bidirectional endo on 3/28/22.  Her antiplatelet meds are also on hold.\par She is status post admission to the ED where she underwent endoscopy which showed normal esophagus, small hiatal hernia, gastritis and normal duodenum.  Colonoscopy with Dr. Hay which demonstrated polyps in the sigmoid colon, diverticulosis of the sigmoid colon and multiple scattered erosions with dried blood in the ascending colon.  She will be following up with Dr. Hay to review the results in 1 week.\par She completed Venofer on March 22, 2022.  Reviewed available labs.  Her hemoglobin has improved to 10.7.  She has mild renal insufficiency this will be forwarded to her PCP.\par Continue monthly B12.   \par She presents presents today for follow-up labs and it was which demonstrated hemoglobin dropped to 8.2 from previous 10.7 on 3/31/2022.  She is also having some leg discomfort.  We have decided to give her 1 unit packed red blood cells tomorrow.  We will also forward the results to her GI.  She has not noted any active bleeding.\par Continue routine, age-appropriate, healthcare maintenance \par History of present illness, review of systems, physical exam and treatment plan reviewed with . \par Office visit in 1 week or prn for new or worsening symptoms. \par Office visit in 2 week or prn for new or worsening symptoms.

## 2022-04-21 ENCOUNTER — APPOINTMENT (OUTPATIENT)
Dept: HEMATOLOGY ONCOLOGY | Facility: CLINIC | Age: 67
End: 2022-04-21

## 2022-04-21 VITALS
BODY MASS INDEX: 23.92 KG/M2 | DIASTOLIC BLOOD PRESSURE: 70 MMHG | TEMPERATURE: 98 F | SYSTOLIC BLOOD PRESSURE: 150 MMHG | OXYGEN SATURATION: 97 % | RESPIRATION RATE: 18 BRPM | HEART RATE: 90 BPM | HEIGHT: 62 IN | WEIGHT: 130 LBS

## 2022-05-12 ENCOUNTER — APPOINTMENT (OUTPATIENT)
Dept: HEMATOLOGY ONCOLOGY | Facility: CLINIC | Age: 67
End: 2022-05-12
Payer: COMMERCIAL

## 2022-05-12 VITALS
HEIGHT: 62 IN | HEART RATE: 93 BPM | WEIGHT: 130 LBS | DIASTOLIC BLOOD PRESSURE: 64 MMHG | TEMPERATURE: 98.1 F | RESPIRATION RATE: 18 BRPM | OXYGEN SATURATION: 97 % | BODY MASS INDEX: 23.92 KG/M2 | SYSTOLIC BLOOD PRESSURE: 152 MMHG

## 2022-05-12 PROCEDURE — 99214 OFFICE O/P EST MOD 30 MIN: CPT

## 2022-05-12 NOTE — HISTORY OF PRESENT ILLNESS
[0 - No Distress] : Distress Level: 0 [de-identified] : Mrs. Manley is a 67 year old female who is referred by Dr.Michael Truong for second opinion for anemia. \par She reports having normal blood counts as of July but was found in Sept to have symptomatic anemia. She has received several transfusions.  She had also received IV iron infusions from her gastroenterologist.  Endoscopy, colonoscopy and capsule endoscopy were all negative and her GI believes that her NEGRA is unrelated to GI issues.  [FreeTextEntry1] : Parenteral B12 monthly [de-identified] : She presents for follow-up of B12 and iron deficiency anemias, receiving monthly dose of B12 parenterally.  She completed Venofer on 3/22/2022.  She presented to the emergency department on March 26, 2022 for worsening weakness, and shortness of breath and black tarry stool. over the past week.   While in the hospital she underwent EGD which showed normal esophagus, small hiatal hernia, gastritis and normal duodenum.  She then underwent colonoscopy on March 28, 2022 at [facility] which showed polyps in the sigmoid colon, diverticulosis of the sigmoid colon and multiple scattered erosions with dried blood in the ascending colon. Her last colonoscopy was in 2020.  She was seen by GI and it was decided that her aspirin would be discontinued she was asked to avoid NSAIDs but she continues on Plavix.  She recently went to the emergency department for painful corn on the side of her foot.  She states that it was infected and she was started on antibiotics.  It continues to be uncomfortable but it is getting rechecked next week.  She reports having leg discomfort.  She denies rash, fever, infections, bleeding, bruising, nausea,vomiting,cough, SOB, chest pain, change in BM, headache or dizziness.

## 2022-05-12 NOTE — DISCUSSION/SUMMARY
[FreeTextEntry1] : Pt presented for CBC. WBC 12.37, HgB 11.1 HCT 35.9 ,000. Pt aware of improved HgB.

## 2022-05-12 NOTE — ASSESSMENT
[FreeTextEntry1] : This appears to be a relatively acute process as she reports hemoglobin levels of more than 13 g/dL in July from routine blood work performed at office visits.   Given how quickly and significantly her hemoglobin has dropped this past summer, to me this suggests more of an RBC loss/destructive process rather than an RBC production issue.  She has already been initiated on intravenous iron by her gastroenterologist and I will evaluate her hematopoietic potential once iron stores have been replete adequately.  She reports an essentially unremarkable bidirectional endoscopies and a recent capsule endoscopy.  I have requested the records from the above.\par I obtained a CBC with differential and a peripheral blood smear review.\par I obtained a complete hematological workup that included a CMP, LDH, haptoglobin, reticulocyte count, B12/MMA/folic acid levels, TSH, SPEP, SIEP, IgQ,serum free light chains, NATO,  RF, panel, ferritin level, and a Jose' test.  Her hemoglobin had improved to, iron indices have mostly normalized, and her serum methylmalonic acid level was elevated.  Given this I initiated her on parenteral B12 and will monitor her iron indices.  Another contributing factor may be her chronic renal insufficiency.   She has had a nearly 2-1/2 g/dL drop in her hemoglobin since her last visit 4 weeks ago.  At that time her iron indices were within normal limits.  I will obtain a complete hematological work-up but given the precipitous drop I am worried for continued blood losses.  I will initiate her on Venofer given the symptomatic nature and to try to avoid transfusional support.   She has met with Dr. Hay earlier this week and has been scheduled for a bidirectional endo on 3/28/22.  Her antiplatelet meds are also on hold.\par She is status post admission to the ED where she underwent endoscopy which showed normal esophagus, small hiatal hernia, gastritis and normal duodenum.  Colonoscopy with Dr. Hay which demonstrated polyps in the sigmoid colon, diverticulosis of the sigmoid colon and multiple scattered erosions with dried blood in the ascending colon.  She will be following up with Dr. Hay to review the results in 1 week.\par She completed Venofer on March 22, 2022.  \par Continue monthly B12.   \par She presented in mid April 2022 for follow-up labs and it was which demonstrated hemoglobin dropped to 8.2 from previous 10.7 on 3/31/2022.  She is also having some leg discomfort.  We have decided to give her 1 unit packed red blood cells.  Her repeat hemoglobin was 11.2 g/dL the following week.  She has been feeling well since and a full set of hematological laboratories will be sent today.\par She will return in 1 month for follow-up and repeat labs or sooner for any new or worrisome symptoms.

## 2022-05-26 ENCOUNTER — APPOINTMENT (OUTPATIENT)
Dept: HEMATOLOGY ONCOLOGY | Facility: CLINIC | Age: 67
End: 2022-05-26
Payer: COMMERCIAL

## 2022-05-26 VITALS
HEIGHT: 62 IN | WEIGHT: 131 LBS | BODY MASS INDEX: 24.11 KG/M2 | HEART RATE: 84 BPM | DIASTOLIC BLOOD PRESSURE: 67 MMHG | OXYGEN SATURATION: 96 % | RESPIRATION RATE: 18 BRPM | SYSTOLIC BLOOD PRESSURE: 141 MMHG | TEMPERATURE: 98.3 F

## 2022-05-26 PROCEDURE — 36415 COLL VENOUS BLD VENIPUNCTURE: CPT

## 2022-05-26 PROCEDURE — 99214 OFFICE O/P EST MOD 30 MIN: CPT | Mod: 25

## 2022-06-05 LAB
FERRITIN SERPL-MCNC: 149 NG/ML
IRON SATN MFR SERPL: 13 %
IRON SERPL-MCNC: 41 UG/DL
TIBC SERPL-MCNC: 321 UG/DL
UIBC SERPL-MCNC: 280 UG/DL

## 2022-06-05 NOTE — ASSESSMENT
[FreeTextEntry1] : This appears to be a relatively acute process as she reports hemoglobin levels of more than 13 g/dL in July from routine blood work performed at office visits.   Given how quickly and significantly her hemoglobin has dropped this past summer, to me this suggests more of an RBC loss/destructive process rather than an RBC production issue.  She has already been initiated on intravenous iron by her gastroenterologist and I will evaluate her hematopoietic potential once iron stores have been replete adequately.  She reports an essentially unremarkable bidirectional endoscopies and a recent capsule endoscopy.  I have requested the records from the above.\par I obtained a CBC with differential and a peripheral blood smear review.\par I obtained a complete hematological workup that included a CMP, LDH, haptoglobin, reticulocyte count, B12/MMA/folic acid levels, TSH, SPEP, SIEP, IgQ,serum free light chains, NATO,  RF, panel, ferritin level, and a Jose' test.  Her hemoglobin had improved to, iron indices have mostly normalized, and her serum methylmalonic acid level was elevated.  Given this I initiated her on parenteral B12 and will monitor her iron indices.  Another contributing factor may be her chronic renal insufficiency.   She has had a nearly 2-1/2 g/dL drop in her hemoglobin since her last visit 4 weeks ago.  At that time her iron indices were within normal limits.  I will obtain a complete hematological work-up but given the precipitous drop I am worried for continued blood losses.  I will initiate her on Venofer given the symptomatic nature and to try to avoid transfusional support.   She has met with Dr. Hay earlier this week and has been scheduled for a bidirectional endo on 3/28/22.  Her antiplatelet meds are also on hold.\par She is status post admission to the ED where she underwent endoscopy which showed normal esophagus, small hiatal hernia, gastritis and normal duodenum.  Colonoscopy with Dr. Hay which demonstrated polyps in the sigmoid colon, diverticulosis of the sigmoid colon and multiple scattered erosions with dried blood in the ascending colon.  She will be following up with Dr. Hay to review the results in 1 week.\par She completed Venofer on March 22, 2022.  \par Continue monthly B12.   \par She presented in mid April 2022 for follow-up labs and it was which demonstrated hemoglobin dropped to 8.2 from previous 10.7 on 3/31/2022.  She is also having some leg discomfort.  We have decided to give her 1 unit packed red blood cells.  Her repeat hemoglobin was 11.2 g/dL the following week.  She has been feeling well since and a full set of hematological laboratories will be sent today.\par Status post vein procedure at [facility] with improvement in symptoms.\par Reviewed available labs- hemoglobin today is 10.7, she remains renally insufficient we will send chem to her nephrologist.  Iron profiles are adequate\par Continue routine, age-appropriate, healthcare maintenance \par History of present illness, review of systems, physical exam and treatment plan reviewed with .\par Office visit in 2 weeks or prn for new or worsening symptoms. \par

## 2022-06-05 NOTE — RESULTS/DATA
[FreeTextEntry1] : WBC 8.05\par Hemoglobin 10.7\par Hematocrit 34.2\par Platelet count 365,000\par Creatinine 1.88\par eGFR 27

## 2022-06-05 NOTE — REVIEW OF SYSTEMS
[Fatigue] : fatigue [Negative] : Allergic/Immunologic [FreeTextEntry5] : Status post vein procedure [FreeTextEntry7] : Dark stools but reports these have been chronically so. [FreeTextEntry9] : Leg discomfort/ cramps

## 2022-06-05 NOTE — HISTORY OF PRESENT ILLNESS
[0 - No Distress] : Distress Level: 0 [de-identified] : Mrs. Manley is a 67 year old female who is referred by Dr.Michael Truong for second opinion for anemia. \par She reports having normal blood counts as of July but was found in Sept to have symptomatic anemia. She has received several transfusions.  She had also received IV iron infusions from her gastroenterologist.  Endoscopy, colonoscopy and capsule endoscopy were all negative and her GI believes that her NEGRA is unrelated to GI issues.  [FreeTextEntry1] : Parenteral B12 monthly [de-identified] : She presents for follow-up of B12 and iron deficiency anemias, receiving monthly dose of B12 parenterally.  She had a vein procedure at [facility] with  for leg pain and cramps.   She required ballons and stents.  She wis seeing her PCP>  She completed Venofer on 3/22/2022.  She presented to the emergency department on March 26, 2022 for worsening weakness, and shortness of breath and black tarry stool. over the past week.   While in the hospital she underwent EGD which showed normal esophagus, small hiatal hernia, gastritis and normal duodenum.  She then underwent colonoscopy on March 28, 2022 at [facility] which showed polyps in the sigmoid colon, diverticulosis of the sigmoid colon and multiple scattered erosions with dried blood in the ascending colon. Her last colonoscopy was in 2020.  She was seen by GI and it was decided that her aspirin would be discontinued she was asked to avoid NSAIDs but she continues on Plavix.  She recently went to the emergency department for painful corn on the side of her foot.  She states that it was infected and she was started on antibiotics.  It continues to be uncomfortable but it is getting rechecked next week.  She reports having leg discomfort.  She denies rash, fever, infections, bleeding, bruising, nausea,vomiting,cough, SOB, chest pain, change in BM, headache or dizziness.

## 2022-06-09 ENCOUNTER — APPOINTMENT (OUTPATIENT)
Dept: HEMATOLOGY ONCOLOGY | Facility: CLINIC | Age: 67
End: 2022-06-09
Payer: COMMERCIAL

## 2022-06-09 VITALS
HEIGHT: 62 IN | WEIGHT: 129 LBS | TEMPERATURE: 97.6 F | OXYGEN SATURATION: 96 % | BODY MASS INDEX: 23.74 KG/M2 | DIASTOLIC BLOOD PRESSURE: 70 MMHG | RESPIRATION RATE: 18 BRPM | SYSTOLIC BLOOD PRESSURE: 175 MMHG | HEART RATE: 87 BPM

## 2022-06-09 PROCEDURE — 99214 OFFICE O/P EST MOD 30 MIN: CPT

## 2022-06-09 NOTE — HISTORY OF PRESENT ILLNESS
[de-identified] : Mrs. Manley is a 67 year old female who is referred by Dr.Michael Truong for second opinion for anemia. \par She reports having normal blood counts as of July but was found in Sept to have symptomatic anemia. She has received several transfusions.  She had also received IV iron infusions from her gastroenterologist.  Endoscopy, colonoscopy and capsule endoscopy were all negative and her GI believes that her NEGRA is unrelated to GI issues.  [FreeTextEntry1] : Parenteral B12 monthly [de-identified] : She presents for follow-up of B12 and iron deficiency anemias, receiving monthly dose of B12 parenterally.  She had a vein procedure at [facility] with  for leg pain and cramps.   She required ballons and stents.  She wis seeing her PCP>  She completed Venofer on 3/22/2022.  She presented to the emergency department on March 26, 2022 for worsening weakness, and shortness of breath and black tarry stool. over the past week.   While in the hospital she underwent EGD which showed normal esophagus, small hiatal hernia, gastritis and normal duodenum.  She then underwent colonoscopy on March 28, 2022 at [facility] which showed polyps in the sigmoid colon, diverticulosis of the sigmoid colon and multiple scattered erosions with dried blood in the ascending colon. Her last colonoscopy was in 2020.  She was seen by GI and it was decided that her aspirin would be discontinued she was asked to avoid NSAIDs but she continues on Plavix.  She recently went to the emergency department for painful corn on the side of her foot.  She states that it was infected and she was started on antibiotics.  It continues to be uncomfortable but it is getting rechecked next week.  She reports having leg discomfort.  She denies rash, fever, infections, bleeding, bruising, nausea,vomiting,cough, SOB, chest pain, change in BM, headache or dizziness.   [0 - No Distress] : Distress Level: 0

## 2022-06-09 NOTE — ASSESSMENT
[FreeTextEntry1] : This appears to be a relatively acute process as she reports hemoglobin levels of more than 13 g/dL in July from routine blood work performed at office visits.   Given how quickly and significantly her hemoglobin has dropped this past summer, to me this suggests more of an RBC loss/destructive process rather than an RBC production issue.  She has already been initiated on intravenous iron by her gastroenterologist and I will evaluate her hematopoietic potential once iron stores have been replete adequately.  She reports an essentially unremarkable bidirectional endoscopies and a recent capsule endoscopy.  I have requested the records from the above.\par I obtained a CBC with differential and a peripheral blood smear review.\par I obtained a complete hematological workup that included a CMP, LDH, haptoglobin, reticulocyte count, B12/MMA/folic acid levels, TSH, SPEP, SIEP, IgQ,serum free light chains, NATO,  RF, panel, ferritin level, and a Jose' test.  Her hemoglobin had improved to, iron indices have mostly normalized, and her serum methylmalonic acid level was elevated.  Given this I initiated her on parenteral B12 and will monitor her iron indices.  Another contributing factor may be her chronic renal insufficiency.   She has had a nearly 2-1/2 g/dL drop in her hemoglobin since her last visit 4 weeks ago.  At that time her iron indices were within normal limits.  I will obtain a complete hematological work-up but given the precipitous drop I am worried for continued blood losses.  I will initiate her on Venofer given the symptomatic nature and to try to avoid transfusional support.   She has met with Dr. Hay earlier this week and has been scheduled for a bidirectional endo on 3/28/22.  Her antiplatelet meds are also on hold.\par She is status post admission to the ED where she underwent endoscopy which showed normal esophagus, small hiatal hernia, gastritis and normal duodenum.  Colonoscopy with Dr. Hay which demonstrated polyps in the sigmoid colon, diverticulosis of the sigmoid colon and multiple scattered erosions with dried blood in the ascending colon.  \par She completed Venofer on March 22, 2022.  \par Continue monthly B12.   \par She presented in mid April 2022 for follow-up labs and it was which demonstrated hemoglobin dropped to 8.2 from previous 10.7 on 3/31/2022.  She is also having some leg discomfort.  We have decided to give her 1 unit packed red blood cells.  Her repeat hemoglobin was 11.2 g/dL the following week.  She has been feeling well since.\par Status post vein procedure at [facility] with improvement in symptoms.\par Reviewed available labs- hemoglobin today is 10.7, she remains renally insufficient we will send chem to her nephrologist.  Iron profiles are adequate\par Continue routine, age-appropriate, healthcare maintenance \par Office visit in 2 weeks or prn for new or worsening symptoms. \par

## 2022-07-06 ENCOUNTER — APPOINTMENT (OUTPATIENT)
Dept: GASTROENTEROLOGY | Facility: CLINIC | Age: 67
End: 2022-07-06

## 2022-07-11 ENCOUNTER — RESULT REVIEW (OUTPATIENT)
Age: 67
End: 2022-07-11

## 2022-07-11 ENCOUNTER — APPOINTMENT (OUTPATIENT)
Dept: HEMATOLOGY ONCOLOGY | Facility: CLINIC | Age: 67
End: 2022-07-11

## 2022-07-11 VITALS
WEIGHT: 127 LBS | RESPIRATION RATE: 18 BRPM | TEMPERATURE: 97.7 F | HEART RATE: 90 BPM | HEIGHT: 62 IN | OXYGEN SATURATION: 95 % | BODY MASS INDEX: 23.37 KG/M2 | DIASTOLIC BLOOD PRESSURE: 69 MMHG | SYSTOLIC BLOOD PRESSURE: 164 MMHG

## 2022-07-19 LAB
FERRITIN SERPL-MCNC: 133 NG/ML
IRON SATN MFR SERPL: 17 %
IRON SERPL-MCNC: 52 UG/DL
TIBC SERPL-MCNC: 313 UG/DL
UIBC SERPL-MCNC: 260 UG/DL

## 2022-08-01 NOTE — ASSESSMENT
[FreeTextEntry1] : This appears to be a relatively acute process as she reports hemoglobin levels of more than 13 g/dL in July from routine blood work performed at office visits.   Given how quickly and significantly her hemoglobin has dropped this past summer, to me this suggests more of an RBC loss/destructive process rather than an RBC production issue.  She has already been initiated on intravenous iron by her gastroenterologist and I will evaluate her hematopoietic potential once iron stores have been replete adequately.  She reports an essentially unremarkable bidirectional endoscopies and a recent capsule endoscopy.  I have requested the records from the above.\par I obtained a CBC with differential and a peripheral blood smear review.\par I obtained a complete hematological workup that included a CMP, LDH, haptoglobin, reticulocyte count, B12/MMA/folic acid levels, TSH, SPEP, SIEP, IgQ,serum free light chains, NATO,  RF, panel, ferritin level, and a Jose' test.  Her hemoglobin had improved to, iron indices have mostly normalized, and her serum methylmalonic acid level was elevated.  Given this I initiated her on parenteral B12 and will monitor her iron indices.  Another contributing factor may be her chronic renal insufficiency.   She has had a nearly 2-1/2 g/dL drop in her hemoglobin since her last visit 4 weeks ago.  At that time her iron indices were within normal limits.  I will obtain a complete hematological work-up but given the precipitous drop I am worried for continued blood losses.  I will initiate her on Venofer given the symptomatic nature and to try to avoid transfusional support.   She has met with Dr. Hay earlier this week and has been scheduled for a bidirectional endo on 3/28/22.  Her antiplatelet meds are also on hold.\par She is status post admission to the ED where she underwent endoscopy which showed normal esophagus, small hiatal hernia, gastritis and normal duodenum.  Colonoscopy with Dr. Hay which demonstrated polyps in the sigmoid colon, diverticulosis of the sigmoid colon and multiple scattered erosions with dried blood in the ascending colon.  \par She completed Venofer on March 22, 2022.  \par Continue monthly B12.   \par She presented in mid April 2022 for follow-up labs and it was which demonstrated hemoglobin dropped to 8.2 from previous 10.7 on 3/31/2022.  She is also having some leg discomfort.  We have decided to give her 1 unit packed red blood cells.  Her repeat hemoglobin was 11.2 g/dL the following week.  She has been feeling well since.\par Status post vein procedure at [facility] with improvement in symptoms.\par Reviewed available labs- hemoglobin today is 10.7, she remains renally insufficient we will send chem to her nephrologist.  Iron profiles are adequate\par Continue routine, age-appropriate, healthcare maintenance \par Office visit in 2 weeks or prn for new or worsening symptoms. \par

## 2022-08-01 NOTE — HISTORY OF PRESENT ILLNESS
[de-identified] : Mrs. Manley is a 67 year old female who is referred by Dr.Michael Truong for second opinion for anemia. \par She reports having normal blood counts as of July but was found in Sept to have symptomatic anemia. She has received several transfusions.  She had also received IV iron infusions from her gastroenterologist.  Endoscopy, colonoscopy and capsule endoscopy were all negative and her GI believes that her NEGRA is unrelated to GI issues.  [FreeTextEntry1] : Parenteral B12 monthly [de-identified] : She presents for follow-up of B12 and iron deficiency anemias, receiving monthly dose of B12 parenterally.  She had a vein procedure at [facility] with  for leg pain and cramps.   She required ballons and stents.  She wis seeing her PCP>  She completed Venofer on 3/22/2022.  She presented to the emergency department on March 26, 2022 for worsening weakness, and shortness of breath and black tarry stool. over the past week.   While in the hospital she underwent EGD which showed normal esophagus, small hiatal hernia, gastritis and normal duodenum.  She then underwent colonoscopy on March 28, 2022 at [facility] which showed polyps in the sigmoid colon, diverticulosis of the sigmoid colon and multiple scattered erosions with dried blood in the ascending colon. Her last colonoscopy was in 2020.  She was seen by GI and it was decided that her aspirin would be discontinued she was asked to avoid NSAIDs but she continues on Plavix.  She recently went to the emergency department for painful corn on the side of her foot.  She states that it was infected and she was started on antibiotics.  It continues to be uncomfortable but it is getting rechecked next week.  She reports having leg discomfort.  She denies rash, fever, infections, bleeding, bruising, nausea,vomiting,cough, SOB, chest pain, change in BM, headache or dizziness.   [0 - No Distress] : Distress Level: 0

## 2022-08-08 ENCOUNTER — RESULT REVIEW (OUTPATIENT)
Age: 67
End: 2022-08-08

## 2022-08-08 ENCOUNTER — APPOINTMENT (OUTPATIENT)
Dept: HEMATOLOGY ONCOLOGY | Facility: CLINIC | Age: 67
End: 2022-08-08

## 2022-08-08 VITALS
TEMPERATURE: 97.8 F | SYSTOLIC BLOOD PRESSURE: 145 MMHG | BODY MASS INDEX: 23.55 KG/M2 | HEART RATE: 88 BPM | OXYGEN SATURATION: 96 % | RESPIRATION RATE: 18 BRPM | HEIGHT: 62 IN | WEIGHT: 128 LBS | DIASTOLIC BLOOD PRESSURE: 71 MMHG

## 2022-08-12 LAB
FERRITIN SERPL-MCNC: 121 NG/ML
IRON SATN MFR SERPL: 18 %
IRON SERPL-MCNC: 60 UG/DL
TIBC SERPL-MCNC: 331 UG/DL
UIBC SERPL-MCNC: 271 UG/DL

## 2022-09-03 LAB
CALCIUM SERPL-MCNC: 10.8 MG/DL
FERRITIN SERPL-MCNC: 111 NG/ML
IRON SATN MFR SERPL: 23 %
IRON SERPL-MCNC: 75 UG/DL
TIBC SERPL-MCNC: 332 UG/DL
UIBC SERPL-MCNC: 257 UG/DL

## 2022-09-13 ENCOUNTER — RESULT REVIEW (OUTPATIENT)
Age: 67
End: 2022-09-13

## 2022-09-13 ENCOUNTER — APPOINTMENT (OUTPATIENT)
Dept: HEMATOLOGY ONCOLOGY | Facility: CLINIC | Age: 67
End: 2022-09-13

## 2022-09-13 VITALS
BODY MASS INDEX: 23.37 KG/M2 | TEMPERATURE: 97.7 F | DIASTOLIC BLOOD PRESSURE: 65 MMHG | OXYGEN SATURATION: 94 % | HEART RATE: 95 BPM | WEIGHT: 127 LBS | RESPIRATION RATE: 18 BRPM | HEIGHT: 62 IN | SYSTOLIC BLOOD PRESSURE: 147 MMHG

## 2022-09-13 LAB
FERRITIN SERPL-MCNC: 222 NG/ML
IRON SATN MFR SERPL: 18 %
IRON SERPL-MCNC: 58 UG/DL
TIBC SERPL-MCNC: 317 UG/DL
UIBC SERPL-MCNC: 260 UG/DL

## 2022-09-13 PROCEDURE — 99213 OFFICE O/P EST LOW 20 MIN: CPT

## 2022-09-24 LAB — IRON SERPL-MCNC: 79 UG/DL

## 2022-09-24 NOTE — ASSESSMENT
[FreeTextEntry1] : This appears to be a relatively acute process as she reports hemoglobin levels of more than 13 g/dL in July from routine blood work performed at office visits.   Given how quickly and significantly her hemoglobin has dropped this past summer, to me this suggests more of an RBC loss/destructive process rather than an RBC production issue.  She has already been initiated on intravenous iron by her gastroenterologist and I will evaluate her hematopoietic potential once iron stores have been replete adequately.  She reports an essentially unremarkable bidirectional endoscopies and a recent capsule endoscopy.  I have requested the records from the above.\par I obtained a CBC with differential and a peripheral blood smear review.\par I obtained a complete hematological workup that included a CMP, LDH, haptoglobin, reticulocyte count, B12/MMA/folic acid levels, TSH, SPEP, SIEP, IgQ,serum free light chains, NATO,  RF, panel, ferritin level, and a Jose' test.  Her hemoglobin had improved to, iron indices have mostly normalized, and her serum methylmalonic acid level was elevated.  Given this I initiated her on parenteral B12 and will monitor her iron indices.  Another contributing factor may be her chronic renal insufficiency.   She has had a nearly 2-1/2 g/dL drop in her hemoglobin since her last visit 4 weeks ago.  At that time her iron indices were within normal limits.  I will obtain a complete hematological work-up but given the precipitous drop I am worried for continued blood losses.  I will initiate her on Venofer given the symptomatic nature and to try to avoid transfusional support.   She has met with Dr. Hay earlier this week and has been scheduled for a bidirectional endo on 3/28/22.  Her antiplatelet meds are also on hold.\par She is status post admission to the ED where she underwent endoscopy which showed normal esophagus, small hiatal hernia, gastritis and normal duodenum.  Colonoscopy with Dr. Hay which demonstrated polyps in the sigmoid colon, diverticulosis of the sigmoid colon and multiple scattered erosions with dried blood in the ascending colon.  \par She completed Venofer on March 22, 2022.  \par Continue monthly B12.   \par She presented in mid April 2022 for follow-up labs and it was which demonstrated hemoglobin dropped to 8.2 from previous 10.7 on 3/31/2022.  She is also having some leg discomfort.  We have decided to give her 1 unit packed red blood cells.  Her repeat hemoglobin was 11.2 g/dL the following week.  She has been feeling well since.\par Status post vein procedure at [facility] with improvement in symptoms.\par Reviewed available labs- hemoglobin today is 13.7, she remains renally insufficient we will send chem to her nephrologist. Her calcium was also elevated and will be repeated.\par Continue routine, age-appropriate, healthcare maintenance \par History of present illness, review of systems, physical exam and treatment plan reviewed with Dr. Esperanza Galvin\par  Office visit in 4 weeks or prn for new or worsening symptoms. \par

## 2022-09-24 NOTE — HISTORY OF PRESENT ILLNESS
[0 - No Distress] : Distress Level: 0 [de-identified] : Mrs. Manley is a 67 year old female who is referred by Dr.Michael Truong for second opinion for anemia. \par She reports having normal blood counts as of July but was found in Sept to have symptomatic anemia. She has received several transfusions.  She had also received IV iron infusions from her gastroenterologist.  Endoscopy, colonoscopy and capsule endoscopy were all negative and her GI believes that her NEGRA is unrelated to GI issues.  [FreeTextEntry1] : Parenteral B12 monthly [de-identified] : She presents for follow-up of B12 and iron deficiency anemias, receiving monthly dose of B12 parenterally. she continues on two oral iron daily.   She completed Venofer on 3/22/2022.  She last had a PRBC transfusion in April. She presented to the emergency department on March 26, 2022 for worsening weakness, and shortness of breath and black tarry stool. over the past week.   While in the hospital she underwent EGD which showed normal esophagus, small hiatal hernia, gastritis and normal duodenum.  She then underwent colonoscopy on March 28, 2022 at [facility] which showed polyps in the sigmoid colon, diverticulosis of the sigmoid colon and multiple scattered erosions with dried blood in the ascending colon. Her last colonoscopy was in 2020.  She was seen by GI and it was decided that her aspirin would be discontinued she was asked to avoid NSAIDs but she continues on Plavix.  She recently went to the emergency department for painful corn on the side of her foot.  She states that it was infected and she was started on antibiotics.  It continues to be uncomfortable but it is getting rechecked next week.  She reports having leg discomfort.  She denies rash, fever, infections, bleeding, bruising, nausea,vomiting,cough, SOB, chest pain, change in BM, headache or dizziness.

## 2022-09-24 NOTE — REVIEW OF SYSTEMS
[Negative] : Constitutional [Fatigue] : no fatigue [FreeTextEntry5] : Status post vein procedure [FreeTextEntry7] : Dark stools but reports these have been chronically so. [FreeTextEntry9] : Leg discomfort/ cramps

## 2022-09-26 ENCOUNTER — NON-APPOINTMENT (OUTPATIENT)
Age: 67
End: 2022-09-26

## 2022-10-05 ENCOUNTER — APPOINTMENT (OUTPATIENT)
Dept: HEMATOLOGY ONCOLOGY | Facility: CLINIC | Age: 67
End: 2022-10-05

## 2022-10-11 ENCOUNTER — RESULT REVIEW (OUTPATIENT)
Age: 67
End: 2022-10-11

## 2022-10-11 ENCOUNTER — APPOINTMENT (OUTPATIENT)
Dept: HEMATOLOGY ONCOLOGY | Facility: CLINIC | Age: 67
End: 2022-10-11

## 2022-10-11 VITALS
BODY MASS INDEX: 23.74 KG/M2 | RESPIRATION RATE: 18 BRPM | WEIGHT: 129 LBS | SYSTOLIC BLOOD PRESSURE: 156 MMHG | TEMPERATURE: 98 F | HEART RATE: 99 BPM | OXYGEN SATURATION: 94 % | HEIGHT: 62 IN | DIASTOLIC BLOOD PRESSURE: 74 MMHG

## 2022-10-11 PROCEDURE — 36415 COLL VENOUS BLD VENIPUNCTURE: CPT

## 2022-10-11 PROCEDURE — 99213 OFFICE O/P EST LOW 20 MIN: CPT | Mod: 25

## 2022-10-11 RX ORDER — TRIAMTERENE AND HYDROCHLOROTHIAZIDE 25; 37.5 MG/1; MG/1
37.5-25 TABLET ORAL
Qty: 30 | Refills: 0 | Status: COMPLETED | COMMUNITY
Start: 2021-06-19 | End: 2022-10-11

## 2022-10-11 RX ORDER — VERAPAMIL HYDROCHLORIDE 80 MG/1
TABLET ORAL
Refills: 0 | Status: COMPLETED | COMMUNITY
End: 2022-10-11

## 2022-10-17 NOTE — HISTORY OF PRESENT ILLNESS
[0 - No Distress] : Distress Level: 0 [de-identified] : Mrs. Manley is a 67 year old female who is referred by Dr.Michael Truong for second opinion for anemia. \par  [de-identified] : She presents for follow-up of B12 and iron deficiency anemias, receiving monthly dose of B12 parenterally. she continues on two oral iron daily.   She completed Venofer on 3/22/2022.  She last had a PRBC transfusion in April. She presented to the emergency department on March 26, 2022 for worsening weakness, and shortness of breath and black tarry stool. over the past week.   While in the hospital she underwent EGD which showed normal esophagus, small hiatal hernia, gastritis and normal duodenum.  She then underwent colonoscopy on March 28, 2022 at [facility] which showed polyps in the sigmoid colon, diverticulosis of the sigmoid colon and multiple scattered erosions with dried blood in the ascending colon. Her last colonoscopy was in 2020.  She was seen by GI and it was decided that her aspirin would be discontinued she was asked to avoid NSAIDs but she continues on Plavix.  She recently went to the emergency department for painful corn on the side of her foot.  She states that it was infected and she was started on antibiotics.  It continues to be uncomfortable but it is getting rechecked next week.  She reports having leg discomfort.  She denies rash, fever, infections, bleeding, bruising, nausea,vomiting,cough, SOB, chest pain, change in BM, headache or dizziness.   [FreeTextEntry1] : Parenteral B12 monthly

## 2022-10-17 NOTE — ASSESSMENT
[FreeTextEntry1] : \par 66 y/o with iron deficiency anemia\par s/p EGD/ colonoscopy 3/22\par also noted to be hypercalcemia\par \par Plan:\par \par f/u cbc/iron studies\par f/u ca levela/PTH/PTH rp

## 2022-10-17 NOTE — REVIEW OF SYSTEMS
[Negative] : Musculoskeletal [Fatigue] : no fatigue [FreeTextEntry5] : Status post vein procedure [FreeTextEntry7] : Dark stools but reports these have been chronically so. [FreeTextEntry9] : Leg discomfort/ cramps

## 2023-01-17 ENCOUNTER — RESULT REVIEW (OUTPATIENT)
Age: 68
End: 2023-01-17

## 2023-01-17 ENCOUNTER — APPOINTMENT (OUTPATIENT)
Dept: HEMATOLOGY ONCOLOGY | Facility: CLINIC | Age: 68
End: 2023-01-17
Payer: COMMERCIAL

## 2023-01-17 VITALS
OXYGEN SATURATION: 94 % | DIASTOLIC BLOOD PRESSURE: 72 MMHG | WEIGHT: 133 LBS | BODY MASS INDEX: 24.48 KG/M2 | SYSTOLIC BLOOD PRESSURE: 153 MMHG | TEMPERATURE: 98 F | HEIGHT: 62 IN | RESPIRATION RATE: 18 BRPM | HEART RATE: 97 BPM

## 2023-01-17 LAB
DEPRECATED KAPPA LC FREE/LAMBDA SER: 2.07 RATIO
FERRITIN SERPL-MCNC: 131 NG/ML
FOLATE SERPL-MCNC: >20 NG/ML
HAPTOGLOB SERPL-MCNC: 209 MG/DL
IRON SATN MFR SERPL: 21 %
IRON SERPL-MCNC: 71 UG/DL
KAPPA LC CSF-MCNC: 4.3 MG/DL
KAPPA LC SERPL-MCNC: 8.88 MG/DL
RBC # BLD: 4.59 M/UL
RETICS # AUTO: 1.1 %
RETICS AGGREG/RBC NFR: 51.9 K/UL
TIBC SERPL-MCNC: 337 UG/DL
UIBC SERPL-MCNC: 266 UG/DL
VIT B12 SERPL-MCNC: 1243 PG/ML

## 2023-01-17 PROCEDURE — 99213 OFFICE O/P EST LOW 20 MIN: CPT

## 2023-01-17 RX ORDER — TOBRAMYCIN 3 MG/ML
0.3 SOLUTION/ DROPS OPHTHALMIC
Qty: 10 | Refills: 0 | Status: ACTIVE | COMMUNITY
Start: 2022-09-26

## 2023-01-17 RX ORDER — LORAZEPAM 0.5 MG/1
0.5 TABLET ORAL
Qty: 2 | Refills: 0 | Status: ACTIVE | COMMUNITY
Start: 2022-11-17

## 2023-02-16 LAB
DEPRECATED KAPPA LC FREE/LAMBDA SER: 2.51 RATIO
DEPRECATED KAPPA LC FREE/LAMBDA SER: 2.51 RATIO
FERRITIN SERPL-MCNC: 187 NG/ML
IGA SER QL IEP: 261 MG/DL
IGG SER QL IEP: 973 MG/DL
IGM SER QL IEP: 65 MG/DL
IRON SATN MFR SERPL: 29 %
IRON SERPL-MCNC: 93 UG/DL
KAPPA LC CSF-MCNC: 3.81 MG/DL
KAPPA LC CSF-MCNC: 3.81 MG/DL
KAPPA LC SERPL-MCNC: 9.56 MG/DL
KAPPA LC SERPL-MCNC: 9.56 MG/DL
TIBC SERPL-MCNC: 322 UG/DL
UIBC SERPL-MCNC: 229 UG/DL

## 2023-02-16 NOTE — ASSESSMENT
[FreeTextEntry1] : \par 68 y/o with iron deficiency anemia\par s/p EGD/ colonoscopy 3/22\par also noted to be hypercalcemia\par \par Plan:\par - Reviewed available labs HgB 13.6,HCT 43.4 Creat 1.72 ca 10.6 today, iron studies normal.\par - Continue B12- Copy of labs to PCP, nephrology\par - Continue routine, age-appropriate, healthcare maintenance \par - History of present illness, review of systems, physical exam and treatment plan reviewed with Dr. Esperanza Galvin\par - Office visit in 12 weeks or prn for new or worsening symptoms. \par  \par

## 2023-02-16 NOTE — HISTORY OF PRESENT ILLNESS
[0 - No Distress] : Distress Level: 0 [de-identified] : Mrs. Manley is a 67 year old female who is referred by Dr.Michael Truong for second opinion for anemia. \par She reports having normal blood counts as of July but was found in Sept to have symptomatic anemia. She has received several transfusions. She had also received IV iron infusions from her gastroenterologist. Endoscopy, colonoscopy and capsule endoscopy were all negative and her GI believes that her NEGRA is unrelated to GI issues. \par  [FreeTextEntry1] : Parenteral B12 monthly [de-identified] : She presents for follow-up of B12 and iron deficiency anemias, receiving monthly dose of B12 parenterally. she continues on two oral iron daily. She completed Venofer on 3/22/2022 and had her last PRBC transfusion in April of 2022.  She reports being diagnosed with carotid stenosis but cannot receive Aspirin. She continues on Plavix. She sees  for mild renal insufficiency.  \par \par \par \par She presented to the emergency department on March 26, 2022 for worsening weakness, and shortness of breath and black tarry stool. over the past week.   While in the hospital she underwent EGD which showed normal esophagus, small hiatal hernia, gastritis and normal duodenum.  She then underwent colonoscopy on March 28, 2022 at [facility] which showed polyps in the sigmoid colon, diverticulosis of the sigmoid colon and multiple scattered erosions with dried blood in the ascending colon. Her last colonoscopy was in 2020.  She was seen by GI and it was decided that her aspirin would be discontinued she was asked to avoid NSAIDs but she continues on Plavix.  She recently went to the emergency department for painful corn on the side of her foot.  She states that it was infected and she was started on antibiotics.  It continues to be uncomfortable but it is getting rechecked next week.  She reports having leg discomfort.  She denies rash, fever, infections, bleeding, bruising, nausea,vomiting,cough, SOB, chest pain, change in BM, headache or dizziness.

## 2023-02-16 NOTE — REVIEW OF SYSTEMS
[Negative] : Allergic/Immunologic [FreeTextEntry5] : Status post vein procedure [Fatigue] : no fatigue [FreeTextEntry7] : Dark stools but reports these have been chronically so. [FreeTextEntry9] : Leg discomfort/ cramps

## 2023-04-27 DIAGNOSIS — D51.9 VITAMIN B12 DEFICIENCY ANEMIA, UNSPECIFIED: ICD-10-CM

## 2023-05-01 ENCOUNTER — RESULT REVIEW (OUTPATIENT)
Age: 68
End: 2023-05-01

## 2023-05-01 ENCOUNTER — APPOINTMENT (OUTPATIENT)
Dept: HEMATOLOGY ONCOLOGY | Facility: CLINIC | Age: 68
End: 2023-05-01
Payer: COMMERCIAL

## 2023-05-01 VITALS
HEART RATE: 115 BPM | DIASTOLIC BLOOD PRESSURE: 74 MMHG | WEIGHT: 136 LBS | SYSTOLIC BLOOD PRESSURE: 170 MMHG | RESPIRATION RATE: 18 BRPM | BODY MASS INDEX: 25.03 KG/M2 | TEMPERATURE: 97.7 F | HEIGHT: 62 IN | OXYGEN SATURATION: 95 %

## 2023-05-01 DIAGNOSIS — M62.838 OTHER MUSCLE SPASM: ICD-10-CM

## 2023-05-01 PROCEDURE — 99213 OFFICE O/P EST LOW 20 MIN: CPT | Mod: 25

## 2023-05-01 PROCEDURE — 36415 COLL VENOUS BLD VENIPUNCTURE: CPT

## 2023-05-07 NOTE — ASSESSMENT
[FreeTextEntry1] : \par 66 y/o with iron deficiency anemia\par s/p EGD/ colonoscopy 3/22\par also noted to be hypercalcemia\par \par Plan:\par \par f/u cbc/iron studies\par f/u ca levela/PTH/PTH rp\par flexeril for back pain\par \par Instructed to follow up in 3 months

## 2023-05-07 NOTE — HISTORY OF PRESENT ILLNESS
[0 - No Distress] : Distress Level: 0 [de-identified] : Mrs. Manley is a 67 year old female who is referred by Dr.Michael Truong for second opinion for anemia. \par  [de-identified] : c/o lower back pain after bending down and developed lower back pain

## 2023-05-07 NOTE — REVIEW OF SYSTEMS
[Negative] : Allergic/Immunologic [Fatigue] : no fatigue [FreeTextEntry5] : Status post vein procedure [FreeTextEntry7] : Dark stools but reports these have been chronically so. [FreeTextEntry9] : Leg discomfort/ cramps

## 2023-08-15 DIAGNOSIS — I70.211 ATHEROSCLEROSIS OF NATIVE ARTERIES OF EXTREMITIES WITH INTERMITTENT CLAUDICATION, RIGHT LEG: ICD-10-CM

## 2023-08-22 ENCOUNTER — APPOINTMENT (OUTPATIENT)
Dept: HEMATOLOGY ONCOLOGY | Facility: CLINIC | Age: 68
End: 2023-08-22

## 2023-09-15 DIAGNOSIS — D50.9 IRON DEFICIENCY ANEMIA, UNSPECIFIED: ICD-10-CM

## 2023-09-15 DIAGNOSIS — D12.6 BENIGN NEOPLASM OF COLON, UNSPECIFIED: ICD-10-CM

## 2023-09-15 DIAGNOSIS — N18.9 CHRONIC KIDNEY DISEASE, UNSPECIFIED: ICD-10-CM

## 2023-09-15 DIAGNOSIS — E83.52 HYPERCALCEMIA: ICD-10-CM

## 2023-09-15 DIAGNOSIS — D61.01 CONSTITUTIONAL (PURE) RED BLOOD CELL APLASIA: ICD-10-CM

## 2023-09-18 ENCOUNTER — APPOINTMENT (OUTPATIENT)
Dept: HEMATOLOGY ONCOLOGY | Facility: CLINIC | Age: 68
End: 2023-09-18
Payer: COMMERCIAL

## 2023-09-18 ENCOUNTER — RESULT REVIEW (OUTPATIENT)
Age: 68
End: 2023-09-18

## 2023-09-18 VITALS
SYSTOLIC BLOOD PRESSURE: 184 MMHG | OXYGEN SATURATION: 90 % | WEIGHT: 132 LBS | BODY MASS INDEX: 24.29 KG/M2 | HEART RATE: 110 BPM | DIASTOLIC BLOOD PRESSURE: 83 MMHG | TEMPERATURE: 97.7 F | RESPIRATION RATE: 18 BRPM | HEIGHT: 62 IN

## 2023-09-18 DIAGNOSIS — R00.0 TACHYCARDIA, UNSPECIFIED: ICD-10-CM

## 2023-09-18 DIAGNOSIS — D64.9 ANEMIA, UNSPECIFIED: ICD-10-CM

## 2023-09-18 LAB
DEPRECATED KAPPA LC FREE/LAMBDA SER: 2.14 RATIO
FERRITIN SERPL-MCNC: 138 NG/ML
IGA SER QL IEP: 278 MG/DL
IGG SER QL IEP: 999 MG/DL
IGM SER QL IEP: 66 MG/DL
IRON SATN MFR SERPL: 25 %
IRON SERPL-MCNC: 78 UG/DL
KAPPA LC CSF-MCNC: 4.17 MG/DL
KAPPA LC SERPL-MCNC: 8.94 MG/DL
TIBC SERPL-MCNC: 313 UG/DL
UIBC SERPL-MCNC: 234 UG/DL
VIT B12 SERPL-MCNC: >2000 PG/ML

## 2023-09-18 PROCEDURE — 99212 OFFICE O/P EST SF 10 MIN: CPT | Mod: 25

## 2023-09-18 PROCEDURE — 36415 COLL VENOUS BLD VENIPUNCTURE: CPT

## 2023-09-18 RX ORDER — LISINOPRIL 5 MG/1
5 TABLET ORAL
Qty: 30 | Refills: 0 | Status: DISCONTINUED | COMMUNITY
Start: 2022-12-22 | End: 2023-09-18

## 2023-09-18 RX ORDER — CHROMIUM 200 MCG
TABLET ORAL
Refills: 0 | Status: DISCONTINUED | COMMUNITY
End: 2023-09-18

## 2023-09-18 RX ORDER — CYCLOBENZAPRINE HYDROCHLORIDE 5 MG/1
5 TABLET, FILM COATED ORAL 3 TIMES DAILY
Qty: 21 | Refills: 0 | Status: DISCONTINUED | COMMUNITY
Start: 2023-05-01 | End: 2023-09-18

## 2023-09-18 RX ORDER — AMLODIPINE BESYLATE 10 MG/1
10 TABLET ORAL
Refills: 0 | Status: DISCONTINUED | COMMUNITY
End: 2023-09-18

## 2023-09-18 RX ORDER — TRIAMTERENE AND HYDROCHLOROTHIAZIDE 37.5; 25 MG/1; MG/1
37.5-25 CAPSULE ORAL
Refills: 0 | Status: DISCONTINUED | COMMUNITY
End: 2023-09-18

## 2023-09-24 LAB
FERRITIN SERPL-MCNC: 125 NG/ML
FOLATE SERPL-MCNC: >20 NG/ML
IRON SATN MFR SERPL: 17 %
IRON SERPL-MCNC: 48 UG/DL
TIBC SERPL-MCNC: 288 UG/DL
UIBC SERPL-MCNC: 239 UG/DL
VIT B12 SERPL-MCNC: 1175 PG/ML

## 2024-04-29 ENCOUNTER — TRANSCRIPTION ENCOUNTER (OUTPATIENT)
Age: 69
End: 2024-04-29

## 2024-05-09 ENCOUNTER — TRANSCRIPTION ENCOUNTER (OUTPATIENT)
Age: 69
End: 2024-05-09

## 2024-05-10 ENCOUNTER — TRANSCRIPTION ENCOUNTER (OUTPATIENT)
Age: 69
End: 2024-05-10

## 2024-05-10 ENCOUNTER — RESULT REVIEW (OUTPATIENT)
Age: 69
End: 2024-05-10

## 2025-01-05 ENCOUNTER — NON-APPOINTMENT (OUTPATIENT)
Age: 70
End: 2025-01-05